# Patient Record
Sex: MALE | Race: OTHER | HISPANIC OR LATINO | URBAN - METROPOLITAN AREA
[De-identification: names, ages, dates, MRNs, and addresses within clinical notes are randomized per-mention and may not be internally consistent; named-entity substitution may affect disease eponyms.]

---

## 2017-07-13 ENCOUNTER — EMERGENCY (EMERGENCY)
Facility: HOSPITAL | Age: 42
LOS: 1 days | Discharge: HOME | End: 2017-07-13
Attending: EMERGENCY MEDICINE
Payer: SELF-PAY

## 2017-07-13 VITALS
RESPIRATION RATE: 18 BRPM | HEART RATE: 79 BPM | TEMPERATURE: 97 F | SYSTOLIC BLOOD PRESSURE: 120 MMHG | DIASTOLIC BLOOD PRESSURE: 89 MMHG | OXYGEN SATURATION: 100 %

## 2017-07-13 VITALS
HEIGHT: 65 IN | SYSTOLIC BLOOD PRESSURE: 106 MMHG | HEART RATE: 85 BPM | DIASTOLIC BLOOD PRESSURE: 70 MMHG | TEMPERATURE: 98 F | RESPIRATION RATE: 16 BRPM | WEIGHT: 164.91 LBS | OXYGEN SATURATION: 98 %

## 2017-07-13 DIAGNOSIS — F10.29 ALCOHOL DEPENDENCE WITH UNSPECIFIED ALCOHOL-INDUCED DISORDER: ICD-10-CM

## 2017-07-13 DIAGNOSIS — F10.129 ALCOHOL ABUSE WITH INTOXICATION, UNSPECIFIED: ICD-10-CM

## 2017-07-13 PROCEDURE — 99053 MED SERV 10PM-8AM 24 HR FAC: CPT

## 2017-07-13 PROCEDURE — 99284 EMERGENCY DEPT VISIT MOD MDM: CPT | Mod: 25

## 2017-07-13 NOTE — ED ADULT NURSE NOTE - OBJECTIVE STATEMENT
Patient complain of alcohol intoxication, sleeping on street, no distress noted. Patient complain of alcohol intoxication, sleeping on street, no distress noted. Patient has roam alert for safety, refused yellow gown, placed closed to nursing station.

## 2017-07-13 NOTE — ED PROVIDER NOTE - MEDICAL DECISION MAKING DETAILS
43 y/o M with suspected EtOH intoxication without trauma. No acute psych concerns or signs of drug abuse. Will observe in ED for improvement of neurologic status.

## 2017-07-13 NOTE — ED ADULT TRIAGE NOTE - CHIEF COMPLAINT QUOTE
BIBA for alcohol intoxication, pt was sleeping in sidewalk after stating that he was drinking alcohol last night, no obvious injuries, calm, cooperates and follows commands

## 2017-07-13 NOTE — ED PROVIDER NOTE - OBJECTIVE STATEMENT
41 y/o M with PMHx of EtOH abuse BIB EMS when found on sidewalk tonight. Pt states that he was drinking a lot last night. Denies any trauma or drug abuse. Also denies abd pain, dizziness, weakness, SI, hallucinations or any other complaints. NKDA.

## 2017-07-13 NOTE — ED ADULT NURSE REASSESSMENT NOTE - NS ED NURSE REASSESS COMMENT FT1
Patient able to ambulate independently. Voided clear yellow urine. Vital signs stable. Roam alert removed.

## 2017-07-13 NOTE — ED ADULT NURSE REASSESSMENT NOTE - NS ED NURSE REASSESS COMMENT FT1
Report received from SHANI Peterson RN. Patient sleeping in bed. Arousable. Breathing on room air. Roam alert and high visibility gown on. In no distress.

## 2018-07-23 ENCOUNTER — EMERGENCY (EMERGENCY)
Facility: HOSPITAL | Age: 43
LOS: 1 days | Discharge: HOME | End: 2018-07-23
Attending: EMERGENCY MEDICINE
Payer: SELF-PAY

## 2018-07-23 VITALS
OXYGEN SATURATION: 100 % | DIASTOLIC BLOOD PRESSURE: 87 MMHG | TEMPERATURE: 100 F | HEART RATE: 100 BPM | HEIGHT: 66 IN | WEIGHT: 179.9 LBS | SYSTOLIC BLOOD PRESSURE: 133 MMHG | RESPIRATION RATE: 18 BRPM

## 2018-07-23 LAB
ACETONE SERPL-MCNC: NEGATIVE — SIGNIFICANT CHANGE UP
ALBUMIN SERPL ELPH-MCNC: 4 G/DL — SIGNIFICANT CHANGE UP (ref 3.5–5)
ALP SERPL-CCNC: 212 U/L — HIGH (ref 40–120)
ALT FLD-CCNC: 43 U/L DA — SIGNIFICANT CHANGE UP (ref 10–60)
ANION GAP SERPL CALC-SCNC: 7 MMOL/L — SIGNIFICANT CHANGE UP (ref 5–17)
ANISOCYTOSIS BLD QL: SLIGHT — SIGNIFICANT CHANGE UP
APPEARANCE UR: CLEAR — SIGNIFICANT CHANGE UP
AST SERPL-CCNC: 204 U/L — HIGH (ref 10–40)
BASOPHILS # BLD AUTO: 0.1 K/UL — SIGNIFICANT CHANGE UP (ref 0–0.2)
BASOPHILS NFR BLD AUTO: 1.3 % — SIGNIFICANT CHANGE UP (ref 0–2)
BILIRUB SERPL-MCNC: 1 MG/DL — SIGNIFICANT CHANGE UP (ref 0.2–1.2)
BILIRUB UR-MCNC: NEGATIVE — SIGNIFICANT CHANGE UP
BUN SERPL-MCNC: 2 MG/DL — LOW (ref 7–18)
CALCIUM SERPL-MCNC: 8.4 MG/DL — SIGNIFICANT CHANGE UP (ref 8.4–10.5)
CHLORIDE SERPL-SCNC: 100 MMOL/L — SIGNIFICANT CHANGE UP (ref 96–108)
CO2 SERPL-SCNC: 28 MMOL/L — SIGNIFICANT CHANGE UP (ref 22–31)
COLOR SPEC: YELLOW — SIGNIFICANT CHANGE UP
CREAT SERPL-MCNC: 0.79 MG/DL — SIGNIFICANT CHANGE UP (ref 0.5–1.3)
DIFF PNL FLD: NEGATIVE — SIGNIFICANT CHANGE UP
EOSINOPHIL # BLD AUTO: 0.1 K/UL — SIGNIFICANT CHANGE UP (ref 0–0.5)
EOSINOPHIL NFR BLD AUTO: 1.5 % — SIGNIFICANT CHANGE UP (ref 0–6)
ETHANOL SERPL-MCNC: 460 MG/DL — HIGH (ref 0–10)
GLUCOSE SERPL-MCNC: 89 MG/DL — SIGNIFICANT CHANGE UP (ref 70–99)
GLUCOSE UR QL: NEGATIVE — SIGNIFICANT CHANGE UP
HCT VFR BLD CALC: 36.6 % — LOW (ref 39–50)
HGB BLD-MCNC: 10.6 G/DL — LOW (ref 13–17)
HYPOCHROMIA BLD QL: SLIGHT — SIGNIFICANT CHANGE UP
KETONES UR-MCNC: NEGATIVE — SIGNIFICANT CHANGE UP
LACTATE SERPL-SCNC: 1.9 MMOL/L — SIGNIFICANT CHANGE UP (ref 0.7–2)
LEUKOCYTE ESTERASE UR-ACNC: NEGATIVE — SIGNIFICANT CHANGE UP
LYMPHOCYTES # BLD AUTO: 1.3 K/UL — SIGNIFICANT CHANGE UP (ref 1–3.3)
LYMPHOCYTES # BLD AUTO: 26.6 % — SIGNIFICANT CHANGE UP (ref 13–44)
MAGNESIUM SERPL-MCNC: 1.5 MG/DL — LOW (ref 1.6–2.6)
MCHC RBC-ENTMCNC: 21.4 PG — LOW (ref 27–34)
MCHC RBC-ENTMCNC: 29 GM/DL — LOW (ref 32–36)
MCV RBC AUTO: 73.7 FL — LOW (ref 80–100)
MICROCYTES BLD QL: SLIGHT — SIGNIFICANT CHANGE UP
MONOCYTES # BLD AUTO: 0.4 K/UL — SIGNIFICANT CHANGE UP (ref 0–0.9)
MONOCYTES NFR BLD AUTO: 7.1 % — SIGNIFICANT CHANGE UP (ref 2–14)
NEUTROPHILS # BLD AUTO: 3.2 K/UL — SIGNIFICANT CHANGE UP (ref 1.8–7.4)
NEUTROPHILS NFR BLD AUTO: 63.5 % — SIGNIFICANT CHANGE UP (ref 43–77)
NITRITE UR-MCNC: NEGATIVE — SIGNIFICANT CHANGE UP
PH UR: 6.5 — SIGNIFICANT CHANGE UP (ref 5–8)
PLAT MORPH BLD: NORMAL — SIGNIFICANT CHANGE UP
PLATELET # BLD AUTO: 42 K/UL — LOW (ref 150–400)
POIKILOCYTOSIS BLD QL AUTO: SLIGHT — SIGNIFICANT CHANGE UP
POLYCHROMASIA BLD QL SMEAR: SLIGHT — SIGNIFICANT CHANGE UP
POTASSIUM SERPL-MCNC: 5.3 MMOL/L — SIGNIFICANT CHANGE UP (ref 3.5–5.3)
POTASSIUM SERPL-SCNC: 5.3 MMOL/L — SIGNIFICANT CHANGE UP (ref 3.5–5.3)
PROT SERPL-MCNC: 10.9 G/DL — HIGH (ref 6–8.3)
PROT UR-MCNC: NEGATIVE — SIGNIFICANT CHANGE UP
RBC # BLD: 4.97 M/UL — SIGNIFICANT CHANGE UP (ref 4.2–5.8)
RBC # FLD: 21.1 % — HIGH (ref 10.3–14.5)
RBC BLD AUTO: ABNORMAL
SODIUM SERPL-SCNC: 135 MMOL/L — SIGNIFICANT CHANGE UP (ref 135–145)
SP GR SPEC: 1.01 — SIGNIFICANT CHANGE UP (ref 1.01–1.02)
TARGETS BLD QL SMEAR: SLIGHT — SIGNIFICANT CHANGE UP
UROBILINOGEN FLD QL: NEGATIVE — SIGNIFICANT CHANGE UP
WBC # BLD: 5.1 K/UL — SIGNIFICANT CHANGE UP (ref 3.8–10.5)
WBC # FLD AUTO: 5.1 K/UL — SIGNIFICANT CHANGE UP (ref 3.8–10.5)

## 2018-07-23 PROCEDURE — 74176 CT ABD & PELVIS W/O CONTRAST: CPT | Mod: 26

## 2018-07-23 PROCEDURE — 71045 X-RAY EXAM CHEST 1 VIEW: CPT | Mod: 26

## 2018-07-23 PROCEDURE — 99285 EMERGENCY DEPT VISIT HI MDM: CPT

## 2018-07-23 RX ORDER — CHLORPROMAZINE HCL 10 MG
25 TABLET ORAL ONCE
Qty: 0 | Refills: 0 | Status: COMPLETED | OUTPATIENT
Start: 2018-07-23 | End: 2018-07-23

## 2018-07-23 RX ORDER — PIPERACILLIN AND TAZOBACTAM 4; .5 G/20ML; G/20ML
3.38 INJECTION, POWDER, LYOPHILIZED, FOR SOLUTION INTRAVENOUS ONCE
Qty: 0 | Refills: 0 | Status: COMPLETED | OUTPATIENT
Start: 2018-07-23 | End: 2018-07-23

## 2018-07-23 RX ORDER — SODIUM CHLORIDE 9 MG/ML
2500 INJECTION INTRAMUSCULAR; INTRAVENOUS; SUBCUTANEOUS ONCE
Qty: 0 | Refills: 0 | Status: COMPLETED | OUTPATIENT
Start: 2018-07-23 | End: 2018-07-23

## 2018-07-23 RX ORDER — MAGNESIUM SULFATE 500 MG/ML
2 VIAL (ML) INJECTION ONCE
Qty: 0 | Refills: 0 | Status: COMPLETED | OUTPATIENT
Start: 2018-07-23 | End: 2018-07-23

## 2018-07-23 RX ADMIN — SODIUM CHLORIDE 5000 MILLILITER(S): 9 INJECTION INTRAMUSCULAR; INTRAVENOUS; SUBCUTANEOUS at 20:14

## 2018-07-23 RX ADMIN — PIPERACILLIN AND TAZOBACTAM 200 GRAM(S): 4; .5 INJECTION, POWDER, LYOPHILIZED, FOR SOLUTION INTRAVENOUS at 20:39

## 2018-07-23 RX ADMIN — Medication 50 GRAM(S): at 23:37

## 2018-07-23 RX ADMIN — Medication 25 MILLIGRAM(S): at 23:40

## 2018-07-23 NOTE — ED PROVIDER NOTE - PROGRESS NOTE DETAILS
Pt's hiccup improves, pt's OX3, CT A/P-hepatic steatosis, pt unable to get up & ambulate, will observe pt.  Pt c/o RU abd pain, will give Morphine SULY to Dr. Jones Pt sleeping , responsive to verbal stimuli, refuses to ambulate. Pt walking, normal gaitm, ate food no vomiting. Pt is well appearing walking with normal gait, stable for discharge and follow up with medical doctor. Pt educated on care and need for follow up. Discussed anticipatory guidance and return precautions. Questions answered. I had a detailed discussion with the patient and/or guardian regarding the historical points, exam findings, and any diagnostic results supporting the discharge diagnosis. Pt walking, normal gaitm, ate food no vomiting. Pt is well appearing walking with normal gait, stable for discharge and follow up with medical doctor. Pt educated on care and need for follow up. Discussed anticipatory guidance and return precautions. Questions answered. I had a detailed discussion with the patient and/or guardian regarding the historical points, exam findings, and any diagnostic results supporting the discharge diagnosis. Denies suicidal ideation, refused detox, denies being un-domiciled, refused  consult.

## 2018-07-23 NOTE — ED PROVIDER NOTE - OBJECTIVE STATEMENT
42 y/o M pt with no significant PMHx except EtOH abuse and no significant PSHx BIB EMS to ED c/o  right lower chest pain for x2 days along with hiccups. Patient has been experiencing a decrease in appetite. Patient has a Hx of EtOH abuse, patient's last drink was today. Patient admits to ingesting 48 cans of beer [place] Patient denies any other complaints. NKDA.

## 2018-07-23 NOTE — ED PROVIDER NOTE - CARE PLAN
Principal Discharge DX:	Alcohol intoxication  Secondary Diagnosis:	Hepatic steatosis  Secondary Diagnosis:	Hypomagnesemia

## 2018-07-24 VITALS
HEART RATE: 79 BPM | SYSTOLIC BLOOD PRESSURE: 119 MMHG | DIASTOLIC BLOOD PRESSURE: 79 MMHG | OXYGEN SATURATION: 98 % | TEMPERATURE: 98 F | RESPIRATION RATE: 18 BRPM

## 2018-07-24 RX ORDER — ONDANSETRON 8 MG/1
4 TABLET, FILM COATED ORAL ONCE
Qty: 0 | Refills: 0 | Status: COMPLETED | OUTPATIENT
Start: 2018-07-24 | End: 2018-07-24

## 2018-07-24 RX ORDER — MORPHINE SULFATE 50 MG/1
4 CAPSULE, EXTENDED RELEASE ORAL ONCE
Qty: 0 | Refills: 0 | Status: DISCONTINUED | OUTPATIENT
Start: 2018-07-24 | End: 2018-07-24

## 2018-07-24 RX ADMIN — ONDANSETRON 4 MILLIGRAM(S): 8 TABLET, FILM COATED ORAL at 00:50

## 2018-07-24 RX ADMIN — MORPHINE SULFATE 4 MILLIGRAM(S): 50 CAPSULE, EXTENDED RELEASE ORAL at 00:51

## 2018-07-24 RX ADMIN — MORPHINE SULFATE 4 MILLIGRAM(S): 50 CAPSULE, EXTENDED RELEASE ORAL at 00:50

## 2018-07-25 LAB
CULTURE RESULTS: SIGNIFICANT CHANGE UP
SPECIMEN SOURCE: SIGNIFICANT CHANGE UP

## 2018-07-29 LAB
CULTURE RESULTS: SIGNIFICANT CHANGE UP
CULTURE RESULTS: SIGNIFICANT CHANGE UP
SPECIMEN SOURCE: SIGNIFICANT CHANGE UP
SPECIMEN SOURCE: SIGNIFICANT CHANGE UP

## 2018-12-17 ENCOUNTER — EMERGENCY (EMERGENCY)
Facility: HOSPITAL | Age: 43
LOS: 1 days | Discharge: HOME | End: 2018-12-17
Attending: EMERGENCY MEDICINE
Payer: SELF-PAY

## 2018-12-17 VITALS
SYSTOLIC BLOOD PRESSURE: 115 MMHG | HEART RATE: 100 BPM | TEMPERATURE: 97 F | RESPIRATION RATE: 18 BRPM | DIASTOLIC BLOOD PRESSURE: 75 MMHG | HEIGHT: 67 IN | OXYGEN SATURATION: 100 % | WEIGHT: 179.9 LBS

## 2018-12-17 PROCEDURE — 82962 GLUCOSE BLOOD TEST: CPT

## 2018-12-17 PROCEDURE — 99285 EMERGENCY DEPT VISIT HI MDM: CPT

## 2018-12-17 PROCEDURE — 99284 EMERGENCY DEPT VISIT MOD MDM: CPT

## 2018-12-17 NOTE — ED ADULT NURSE NOTE - NSIMPLEMENTINTERV_GEN_ALL_ED
Implemented All Universal Safety Interventions:  [place] to call system. Call bell, personal items and telephone within reach. Instruct patient to call for assistance. Room bathroom lighting operational. Non-slip footwear when patient is off stretcher. Physically safe environment: no spills, clutter or unnecessary equipment. Stretcher in lowest position, wheels locked, appropriate side rails in place.

## 2018-12-17 NOTE — ED PROVIDER NOTE - PROGRESS NOTE DETAILS
patient undressed, no evidence of swelling bruising or tenderness to palpation on exam of either arm. now ambulatory with steady gait, awake alert, no tongue fasiculations, requesting discharge

## 2018-12-17 NOTE — ED PROVIDER NOTE - OBJECTIVE STATEMENT
42 y/o M with no significant PMHx or PSHx presents to the ED brought in by ambulance. Patient is homeless and was found sleeping on the street. Patient admits to drinking one beer today. Patient reports L arm pain after falling today. NKDA.

## 2023-03-16 ENCOUNTER — APPOINTMENT (OUTPATIENT)
Dept: CT IMAGING | Age: 48
End: 2023-03-16
Attending: EMERGENCY MEDICINE
Payer: MEDICAID

## 2023-03-16 ENCOUNTER — HOSPITAL ENCOUNTER (INPATIENT)
Age: 48
LOS: 6 days | Discharge: HOME | End: 2023-03-22
Attending: EMERGENCY MEDICINE | Admitting: STUDENT IN AN ORGANIZED HEALTH CARE EDUCATION/TRAINING PROGRAM
Payer: MEDICAID

## 2023-03-16 ENCOUNTER — APPOINTMENT (OUTPATIENT)
Dept: GENERAL RADIOLOGY | Age: 48
End: 2023-03-16
Attending: EMERGENCY MEDICINE
Payer: MEDICAID

## 2023-03-16 ENCOUNTER — APPOINTMENT (OUTPATIENT)
Dept: ULTRASOUND IMAGING | Age: 48
End: 2023-03-16
Attending: STUDENT IN AN ORGANIZED HEALTH CARE EDUCATION/TRAINING PROGRAM
Payer: MEDICAID

## 2023-03-16 ENCOUNTER — APPOINTMENT (OUTPATIENT)
Dept: GENERAL RADIOLOGY | Age: 48
End: 2023-03-16
Attending: STUDENT IN AN ORGANIZED HEALTH CARE EDUCATION/TRAINING PROGRAM
Payer: MEDICAID

## 2023-03-16 ENCOUNTER — APPOINTMENT (OUTPATIENT)
Dept: VASCULAR SURGERY | Age: 48
End: 2023-03-16
Attending: EMERGENCY MEDICINE
Payer: MEDICAID

## 2023-03-16 DIAGNOSIS — L03.115 CELLULITIS OF RIGHT LOWER EXTREMITY: ICD-10-CM

## 2023-03-16 DIAGNOSIS — K74.60 HEPATIC CIRRHOSIS, UNSPECIFIED HEPATIC CIRRHOSIS TYPE, UNSPECIFIED WHETHER ASCITES PRESENT (HCC): ICD-10-CM

## 2023-03-16 DIAGNOSIS — S82.891S CLOSED FRACTURE OF RIGHT ANKLE, SEQUELA: Primary | ICD-10-CM

## 2023-03-16 DIAGNOSIS — I26.99 PULMONARY INFARCTION (HCC): ICD-10-CM

## 2023-03-16 DIAGNOSIS — K80.20 CALCULUS OF GALLBLADDER WITHOUT CHOLECYSTITIS WITHOUT OBSTRUCTION: ICD-10-CM

## 2023-03-16 DIAGNOSIS — G93.40 ENCEPHALOPATHY: ICD-10-CM

## 2023-03-16 DIAGNOSIS — R60.0 EDEMA OF RIGHT LOWER EXTREMITY: ICD-10-CM

## 2023-03-16 PROBLEM — R41.82 AMS (ALTERED MENTAL STATUS): Status: ACTIVE | Noted: 2023-03-16

## 2023-03-16 LAB
ALBUMIN SERPL-MCNC: 2.9 G/DL (ref 3.5–5)
ALBUMIN/GLOB SERPL: 0.5 (ref 1.1–2.2)
ALP SERPL-CCNC: 112 U/L (ref 45–117)
ALT SERPL-CCNC: 50 U/L (ref 12–78)
AMMONIA PLAS-SCNC: 56 UMOL/L
AMPHET UR QL SCN: NEGATIVE
ANION GAP SERPL CALC-SCNC: 10 MMOL/L (ref 5–15)
APPEARANCE UR: CLEAR
AST SERPL-CCNC: 113 U/L (ref 15–37)
BACTERIA URNS QL MICRO: NEGATIVE /HPF
BARBITURATES UR QL SCN: NEGATIVE
BASOPHILS # BLD: 0 K/UL (ref 0–0.1)
BASOPHILS NFR BLD: 1 % (ref 0–1)
BENZODIAZ UR QL: POSITIVE
BILIRUB SERPL-MCNC: 1.9 MG/DL (ref 0.2–1)
BILIRUB UR QL CFM: NEGATIVE
BUN SERPL-MCNC: 12 MG/DL (ref 6–20)
BUN/CREAT SERPL: 16 (ref 12–20)
CALCIUM SERPL-MCNC: 7.9 MG/DL (ref 8.5–10.1)
CANNABINOIDS UR QL SCN: NEGATIVE
CHLORIDE SERPL-SCNC: 99 MMOL/L (ref 97–108)
CO2 SERPL-SCNC: 26 MMOL/L (ref 21–32)
COCAINE UR QL SCN: NEGATIVE
COLOR UR: ABNORMAL
CREAT SERPL-MCNC: 0.75 MG/DL (ref 0.7–1.3)
D DIMER PPP FEU-MCNC: 19.54 MG/L FEU (ref 0–0.65)
DIFFERENTIAL METHOD BLD: ABNORMAL
DRUG SCRN COMMENT,DRGCM: ABNORMAL
EOSINOPHIL # BLD: 0.2 K/UL (ref 0–0.4)
EOSINOPHIL NFR BLD: 7 % (ref 0–7)
EPITH CASTS URNS QL MICRO: NORMAL /LPF
ERYTHROCYTE [DISTWIDTH] IN BLOOD BY AUTOMATED COUNT: 23 % (ref 11.5–14.5)
ETHANOL SERPL-MCNC: <10 MG/DL
FLUAV RNA SPEC QL NAA+PROBE: NOT DETECTED
FLUBV RNA SPEC QL NAA+PROBE: NOT DETECTED
GLOBULIN SER CALC-MCNC: 5.6 G/DL (ref 2–4)
GLUCOSE SERPL-MCNC: 95 MG/DL (ref 65–100)
GLUCOSE UR STRIP.AUTO-MCNC: NEGATIVE MG/DL
HCT VFR BLD AUTO: 31.2 % (ref 36.6–50.3)
HGB BLD-MCNC: 10.2 G/DL (ref 12.1–17)
HGB UR QL STRIP: NEGATIVE
IMM GRANULOCYTES # BLD AUTO: 0 K/UL (ref 0–0.04)
IMM GRANULOCYTES NFR BLD AUTO: 0 % (ref 0–0.5)
INR PPP: 1.6 (ref 0.9–1.1)
KETONES UR QL STRIP.AUTO: ABNORMAL MG/DL
LEUKOCYTE ESTERASE UR QL STRIP.AUTO: ABNORMAL
LYMPHOCYTES # BLD: 0.7 K/UL (ref 0.8–3.5)
LYMPHOCYTES NFR BLD: 23 % (ref 12–49)
MAGNESIUM SERPL-MCNC: 1 MG/DL (ref 1.6–2.4)
MCH RBC QN AUTO: 27.5 PG (ref 26–34)
MCHC RBC AUTO-ENTMCNC: 32.7 G/DL (ref 30–36.5)
MCV RBC AUTO: 84.1 FL (ref 80–99)
METHADONE UR QL: NEGATIVE
MONOCYTES # BLD: 0.8 K/UL (ref 0–1)
MONOCYTES NFR BLD: 25 % (ref 5–13)
NEUTS SEG # BLD: 1.4 K/UL (ref 1.8–8)
NEUTS SEG NFR BLD: 44 % (ref 32–75)
NITRITE UR QL STRIP.AUTO: NEGATIVE
NRBC # BLD: 0 K/UL (ref 0–0.01)
NRBC BLD-RTO: 0 PER 100 WBC
OPIATES UR QL: NEGATIVE
PCP UR QL: NEGATIVE
PH UR STRIP: 6.5 (ref 5–8)
PLATELET # BLD AUTO: 66 K/UL (ref 150–400)
PLATELET COMMENTS,PCOM: ABNORMAL
POTASSIUM SERPL-SCNC: 3.4 MMOL/L (ref 3.5–5.1)
PROT SERPL-MCNC: 8.5 G/DL (ref 6.4–8.2)
PROT UR STRIP-MCNC: NEGATIVE MG/DL
PROTHROMBIN TIME: 15.8 SEC (ref 9–11.1)
RBC # BLD AUTO: 3.71 M/UL (ref 4.1–5.7)
RBC #/AREA URNS HPF: NORMAL /HPF (ref 0–5)
RBC MORPH BLD: ABNORMAL
RBC MORPH BLD: ABNORMAL
SARS-COV-2 RNA RESP QL NAA+PROBE: NOT DETECTED
SODIUM SERPL-SCNC: 135 MMOL/L (ref 136–145)
SP GR UR REFRACTOMETRY: 1.02
TSH SERPL DL<=0.05 MIU/L-ACNC: 1.5 UIU/ML (ref 0.36–3.74)
UROBILINOGEN UR QL STRIP.AUTO: >8 EU/DL (ref 0.2–1)
WBC # BLD AUTO: 3.1 K/UL (ref 4.1–11.1)
WBC URNS QL MICRO: NORMAL /HPF (ref 0–4)

## 2023-03-16 PROCEDURE — 86592 SYPHILIS TEST NON-TREP QUAL: CPT

## 2023-03-16 PROCEDURE — 73610 X-RAY EXAM OF ANKLE: CPT

## 2023-03-16 PROCEDURE — 93971 EXTREMITY STUDY: CPT

## 2023-03-16 PROCEDURE — 96374 THER/PROPH/DIAG INJ IV PUSH: CPT

## 2023-03-16 PROCEDURE — 82140 ASSAY OF AMMONIA: CPT

## 2023-03-16 PROCEDURE — 36415 COLL VENOUS BLD VENIPUNCTURE: CPT

## 2023-03-16 PROCEDURE — 80053 COMPREHEN METABOLIC PANEL: CPT

## 2023-03-16 PROCEDURE — 74011250636 HC RX REV CODE- 250/636: Performed by: NURSE PRACTITIONER

## 2023-03-16 PROCEDURE — 81001 URINALYSIS AUTO W/SCOPE: CPT

## 2023-03-16 PROCEDURE — 71045 X-RAY EXAM CHEST 1 VIEW: CPT

## 2023-03-16 PROCEDURE — 74011000250 HC RX REV CODE- 250: Performed by: STUDENT IN AN ORGANIZED HEALTH CARE EDUCATION/TRAINING PROGRAM

## 2023-03-16 PROCEDURE — 80074 ACUTE HEPATITIS PANEL: CPT

## 2023-03-16 PROCEDURE — 74011250636 HC RX REV CODE- 250/636: Performed by: STUDENT IN AN ORGANIZED HEALTH CARE EDUCATION/TRAINING PROGRAM

## 2023-03-16 PROCEDURE — 99285 EMERGENCY DEPT VISIT HI MDM: CPT

## 2023-03-16 PROCEDURE — 87636 SARSCOV2 & INF A&B AMP PRB: CPT

## 2023-03-16 PROCEDURE — 80307 DRUG TEST PRSMV CHEM ANLYZR: CPT

## 2023-03-16 PROCEDURE — 82077 ASSAY SPEC XCP UR&BREATH IA: CPT

## 2023-03-16 PROCEDURE — 74177 CT ABD & PELVIS W/CONTRAST: CPT

## 2023-03-16 PROCEDURE — 70450 CT HEAD/BRAIN W/O DYE: CPT

## 2023-03-16 PROCEDURE — 93971 EXTREMITY STUDY: CPT | Performed by: INTERNAL MEDICINE

## 2023-03-16 PROCEDURE — 82607 VITAMIN B-12: CPT

## 2023-03-16 PROCEDURE — 76705 ECHO EXAM OF ABDOMEN: CPT

## 2023-03-16 PROCEDURE — 85025 COMPLETE CBC W/AUTO DIFF WBC: CPT

## 2023-03-16 PROCEDURE — 84443 ASSAY THYROID STIM HORMONE: CPT

## 2023-03-16 PROCEDURE — 74011000636 HC RX REV CODE- 636: Performed by: EMERGENCY MEDICINE

## 2023-03-16 PROCEDURE — 74011250636 HC RX REV CODE- 250/636: Performed by: EMERGENCY MEDICINE

## 2023-03-16 PROCEDURE — 71275 CT ANGIOGRAPHY CHEST: CPT

## 2023-03-16 PROCEDURE — 85379 FIBRIN DEGRADATION QUANT: CPT

## 2023-03-16 PROCEDURE — 36600 WITHDRAWAL OF ARTERIAL BLOOD: CPT

## 2023-03-16 PROCEDURE — 82746 ASSAY OF FOLIC ACID SERUM: CPT

## 2023-03-16 PROCEDURE — HZ2ZZZZ DETOXIFICATION SERVICES FOR SUBSTANCE ABUSE TREATMENT: ICD-10-PCS | Performed by: INTERNAL MEDICINE

## 2023-03-16 PROCEDURE — 85610 PROTHROMBIN TIME: CPT

## 2023-03-16 PROCEDURE — 74011250637 HC RX REV CODE- 250/637: Performed by: STUDENT IN AN ORGANIZED HEALTH CARE EDUCATION/TRAINING PROGRAM

## 2023-03-16 PROCEDURE — 74011000258 HC RX REV CODE- 258: Performed by: STUDENT IN AN ORGANIZED HEALTH CARE EDUCATION/TRAINING PROGRAM

## 2023-03-16 PROCEDURE — 83735 ASSAY OF MAGNESIUM: CPT

## 2023-03-16 PROCEDURE — 65270000032 HC RM SEMIPRIVATE

## 2023-03-16 RX ORDER — DOXYCYCLINE HYCLATE 100 MG
100 TABLET ORAL 2 TIMES DAILY
Qty: 14 TABLET | Refills: 0 | Status: SHIPPED | OUTPATIENT
Start: 2023-03-16 | End: 2023-03-21 | Stop reason: SDUPTHER

## 2023-03-16 RX ORDER — SODIUM CHLORIDE 0.9 % (FLUSH) 0.9 %
5-40 SYRINGE (ML) INJECTION EVERY 8 HOURS
Status: DISCONTINUED | OUTPATIENT
Start: 2023-03-16 | End: 2023-03-22 | Stop reason: HOSPADM

## 2023-03-16 RX ORDER — LORAZEPAM 2 MG/ML
1 INJECTION INTRAMUSCULAR
Status: DISCONTINUED | OUTPATIENT
Start: 2023-03-16 | End: 2023-03-22 | Stop reason: HOSPADM

## 2023-03-16 RX ORDER — DOXYCYCLINE HYCLATE 100 MG
100 TABLET ORAL EVERY 12 HOURS
Status: DISCONTINUED | OUTPATIENT
Start: 2023-03-16 | End: 2023-03-22 | Stop reason: HOSPADM

## 2023-03-16 RX ORDER — KETOROLAC TROMETHAMINE 30 MG/ML
30 INJECTION, SOLUTION INTRAMUSCULAR; INTRAVENOUS
Status: COMPLETED | OUTPATIENT
Start: 2023-03-16 | End: 2023-03-16

## 2023-03-16 RX ORDER — SODIUM CHLORIDE, SODIUM LACTATE, POTASSIUM CHLORIDE, CALCIUM CHLORIDE 600; 310; 30; 20 MG/100ML; MG/100ML; MG/100ML; MG/100ML
100 INJECTION, SOLUTION INTRAVENOUS CONTINUOUS
Status: DISCONTINUED | OUTPATIENT
Start: 2023-03-16 | End: 2023-03-18

## 2023-03-16 RX ORDER — ENOXAPARIN SODIUM 100 MG/ML
40 INJECTION SUBCUTANEOUS DAILY
Status: DISCONTINUED | OUTPATIENT
Start: 2023-03-17 | End: 2023-03-22 | Stop reason: HOSPADM

## 2023-03-16 RX ORDER — ACETAMINOPHEN 325 MG/1
650 TABLET ORAL
Status: ON HOLD | COMMUNITY
End: 2023-03-16

## 2023-03-16 RX ORDER — FOLIC ACID 1 MG/1
1 TABLET ORAL DAILY
Status: DISCONTINUED | OUTPATIENT
Start: 2023-03-17 | End: 2023-03-22 | Stop reason: HOSPADM

## 2023-03-16 RX ORDER — ACETAMINOPHEN 650 MG/1
650 SUPPOSITORY RECTAL
Status: DISCONTINUED | OUTPATIENT
Start: 2023-03-16 | End: 2023-03-22 | Stop reason: HOSPADM

## 2023-03-16 RX ORDER — CEPHALEXIN 500 MG/1
500 CAPSULE ORAL EVERY 6 HOURS
Status: DISCONTINUED | OUTPATIENT
Start: 2023-03-16 | End: 2023-03-16

## 2023-03-16 RX ORDER — BACITRACIN 500 UNIT/G
1 PACKET (EA) TOPICAL ONCE
Status: DISCONTINUED | OUTPATIENT
Start: 2023-03-16 | End: 2023-03-16

## 2023-03-16 RX ORDER — MULTIVITAMIN
1 TABLET ORAL DAILY
Status: ON HOLD | COMMUNITY
End: 2023-03-16

## 2023-03-16 RX ORDER — CEPHALEXIN 500 MG/1
500 CAPSULE ORAL 4 TIMES DAILY
Qty: 28 CAPSULE | Refills: 0 | Status: SHIPPED | OUTPATIENT
Start: 2023-03-16 | End: 2023-03-16

## 2023-03-16 RX ORDER — ONDANSETRON 4 MG/1
4 TABLET, ORALLY DISINTEGRATING ORAL
Status: DISCONTINUED | OUTPATIENT
Start: 2023-03-16 | End: 2023-03-22 | Stop reason: HOSPADM

## 2023-03-16 RX ORDER — ONDANSETRON 2 MG/ML
4 INJECTION INTRAMUSCULAR; INTRAVENOUS
Status: DISCONTINUED | OUTPATIENT
Start: 2023-03-16 | End: 2023-03-22 | Stop reason: HOSPADM

## 2023-03-16 RX ORDER — MAGNESIUM SULFATE 1 G/100ML
1 INJECTION INTRAVENOUS ONCE
Status: COMPLETED | OUTPATIENT
Start: 2023-03-16 | End: 2023-03-16

## 2023-03-16 RX ORDER — POLYETHYLENE GLYCOL 3350 17 G/17G
17 POWDER, FOR SOLUTION ORAL DAILY PRN
Status: DISCONTINUED | OUTPATIENT
Start: 2023-03-16 | End: 2023-03-22 | Stop reason: HOSPADM

## 2023-03-16 RX ORDER — LORAZEPAM 2 MG/ML
2 INJECTION INTRAMUSCULAR
Status: DISCONTINUED | OUTPATIENT
Start: 2023-03-16 | End: 2023-03-22 | Stop reason: HOSPADM

## 2023-03-16 RX ORDER — MORPHINE SULFATE 2 MG/ML
2 INJECTION, SOLUTION INTRAMUSCULAR; INTRAVENOUS
Status: DISCONTINUED | OUTPATIENT
Start: 2023-03-16 | End: 2023-03-22 | Stop reason: HOSPADM

## 2023-03-16 RX ORDER — MAGNESIUM SULFATE HEPTAHYDRATE 40 MG/ML
2 INJECTION, SOLUTION INTRAVENOUS ONCE
Status: COMPLETED | OUTPATIENT
Start: 2023-03-16 | End: 2023-03-16

## 2023-03-16 RX ORDER — ACETAMINOPHEN 325 MG/1
650 TABLET ORAL
Status: DISCONTINUED | OUTPATIENT
Start: 2023-03-16 | End: 2023-03-22 | Stop reason: HOSPADM

## 2023-03-16 RX ORDER — ENOXAPARIN SODIUM 100 MG/ML
40 INJECTION SUBCUTANEOUS DAILY
Status: DISCONTINUED | OUTPATIENT
Start: 2023-03-17 | End: 2023-03-16

## 2023-03-16 RX ORDER — SODIUM CHLORIDE 0.9 % (FLUSH) 0.9 %
5-40 SYRINGE (ML) INJECTION AS NEEDED
Status: DISCONTINUED | OUTPATIENT
Start: 2023-03-16 | End: 2023-03-22 | Stop reason: HOSPADM

## 2023-03-16 RX ORDER — POTASSIUM CHLORIDE 7.45 MG/ML
10 INJECTION INTRAVENOUS
Status: COMPLETED | OUTPATIENT
Start: 2023-03-16 | End: 2023-03-17

## 2023-03-16 RX ADMIN — POTASSIUM CHLORIDE 10 MEQ: 7.46 INJECTION, SOLUTION INTRAVENOUS at 23:50

## 2023-03-16 RX ADMIN — MAGNESIUM SULFATE HEPTAHYDRATE 1 G: 1 INJECTION, SOLUTION INTRAVENOUS at 22:03

## 2023-03-16 RX ADMIN — SODIUM CHLORIDE, PRESERVATIVE FREE 10 ML: 5 INJECTION INTRAVENOUS at 17:06

## 2023-03-16 RX ADMIN — THIAMINE HYDROCHLORIDE 400 MG: 100 INJECTION, SOLUTION INTRAMUSCULAR; INTRAVENOUS at 17:06

## 2023-03-16 RX ADMIN — CEFTRIAXONE SODIUM 1 G: 1 INJECTION, POWDER, FOR SOLUTION INTRAMUSCULAR; INTRAVENOUS at 17:06

## 2023-03-16 RX ADMIN — POTASSIUM CHLORIDE 10 MEQ: 7.46 INJECTION, SOLUTION INTRAVENOUS at 22:26

## 2023-03-16 RX ADMIN — THIAMINE HYDROCHLORIDE 400 MG: 100 INJECTION, SOLUTION INTRAMUSCULAR; INTRAVENOUS at 22:00

## 2023-03-16 RX ADMIN — SODIUM CHLORIDE, POTASSIUM CHLORIDE, SODIUM LACTATE AND CALCIUM CHLORIDE 100 ML/HR: 600; 310; 30; 20 INJECTION, SOLUTION INTRAVENOUS at 20:07

## 2023-03-16 RX ADMIN — LORAZEPAM 2 MG: 2 INJECTION INTRAMUSCULAR; INTRAVENOUS at 17:40

## 2023-03-16 RX ADMIN — SODIUM CHLORIDE, PRESERVATIVE FREE 10 ML: 5 INJECTION INTRAVENOUS at 22:08

## 2023-03-16 RX ADMIN — MAGNESIUM SULFATE HEPTAHYDRATE 2 G: 40 INJECTION, SOLUTION INTRAVENOUS at 20:07

## 2023-03-16 RX ADMIN — LACTULOSE 15 ML: 20 SOLUTION ORAL at 17:43

## 2023-03-16 RX ADMIN — KETOROLAC TROMETHAMINE 30 MG: 30 INJECTION, SOLUTION INTRAMUSCULAR; INTRAVENOUS at 03:30

## 2023-03-16 RX ADMIN — IOPAMIDOL 100 ML: 755 INJECTION, SOLUTION INTRAVENOUS at 11:33

## 2023-03-16 NOTE — PROGRESS NOTES
[facility] Medication Reconciliation    Information obtained from:Patient (thru )  RxQuery data available1:yes    Comments/recommendations:    Mr. Andrzej Qureshi is a Lao speaking only patient. Med rec was reviewed with the assistance of a . Patient is not presently taking any medication at this time. The [place] Prescription Monitoring Program () was accessed to determine fill history of any controlled medications. Patient was not listed    Medication changes (since last review):  Removed  Acetaminophen, multivitamin       1RxQuery pharmacy benefit data reflects medications filled and processed through the patient's insurance, however                this data does NOT capture whether the medication was picked up or is currently being taken by the patient. Patient allergies:    Allergies as of 03/16/2023    (No Known Allergies)         None          Thank you,  TRINA Thompson

## 2023-03-16 NOTE — DISCHARGE INSTRUCTIONS
You have been admitted and treated at [facility] due to Pneumonia and rigth leg cellulitis. You should complete antibiotics as prescribed. You should abstinent from alcohol. You should follow up PCP in 1 week, magnesium and potassium level should be checked in 1 week. Do not drive a car until you see your PCP. Pending blood work: QFT. QuantiFERON Plus Negative   Negative      You have liver cirrhosis most likely related to alcohol use and alcohol cessation is recommended. You should follow up hepatologist as outpatient. You have chronic fracture of right ankle and should follow up with PCP and get referral to ortho surgeon for further evaluation as outpatient, WBAT right leg.

## 2023-03-16 NOTE — ED NOTES
Pt changed into hospital gown and gripper socks. Pt then transported back down to CT to complete ordered imaging.

## 2023-03-16 NOTE — H&P
History and Physical    Date of Service:  3/16/2023  Primary Care Provider: None  Source of information: The patient and Chart review    Chief Complaint: Leg Pain (Right leg pain, swelling)      History of Presenting Illness:   Igor Pressley is a 52 y.o. male who presents with right ankle and right foot pain for several days ago. Patient is a Bulgarian-speaking patient had a good historian most of the history was provided by Bulgarian-speaking RN at bedside and translating services. Patient is complaining of pain in right foot and right leg. Denied any nausea vomiting abdominal pain. Patient is only AAO x1 cannot tell time date or where he is going on to tell his name PT patient states that he for past 1 year has been drinking really heavily but has not had any drink for past 1 month. End[facility] that [facility] but he got endoscopy and foot repair. Patient denies taking any medication at home. Denies tobacco use any decrease in drug use    The patient denies any headache, blurry vision, sore throat, trouble swallowing, trouble with speech, chest pain, SOB, cough, fever, chills, N/V/D, abd pain, urinary symptoms, constipation, recent travels, sick contacts, focal or generalized neurological symptoms, falls, injuries, rashes, contact with COVID-19 diagnosed patients, hematemesis, melena, hemoptysis, hematuria, rashes, denies starting any new medications and denies any other concerns or problems besides as mentioned above. REVIEW OF SYSTEMS:  A comprehensive review of systems was negative except for: Musculoskeletal: positive for arthralgias     History reviewed. No pertinent past medical history. History reviewed. No pertinent surgical history. Prior to Admission medications    Medication Sig Start Date End Date Taking? Authorizing Provider   multivitamin (ONE A DAY) tablet Take 1 Tablet by mouth daily.    Yes Other, MD Whitney   acetaminophen (TylenoL) 325 mg tablet Take 650 mg by mouth every four (4) hours as needed for Pain. Yes Other, MD Whitney   doxycycline (VIBRA-TABS) 100 mg tablet Take 1 Tablet by mouth two (2) times a day for 7 days. 3/16/23 3/23/23 Yes Genesis Gaming MD     No Known Allergies   History reviewed. No pertinent family history. Social History:  reports that he has never smoked. He has never used smokeless tobacco. He reports that he does not use drugs. Social Determinants of Health     Tobacco Use: Low Risk     Smoking Tobacco Use: Never    Smokeless Tobacco Use: Never    Passive Exposure: Not on file   Alcohol Use: Not on file   Financial Resource Strain: Not on file   Food Insecurity: Not on file   Transportation Needs: Not on file   Physical Activity: Not on file   Stress: Not on file   Social Connections: Not on file   Intimate Partner Violence: Not on file   Depression: Not on file   Housing Stability: Not on file        Medications were reconciled to the best of my ability given all available resources at the time of admission. Route is PO if not otherwise noted. Family and social history were personally reviewed, all pertinent and relevant details are outlined as above.     Objective:   Visit Vitals  /61   Pulse 87   Temp 99.1 °F (37.3 °C)   Resp 25   Ht 5' 1.81\" (1.57 m)   Wt 79.4 kg (175 lb)   SpO2 97%   BMI 32.20 kg/m²      O2 Device: None (Room air)    PHYSICAL EXAM:   General: Alert x oriented x 1, awake, no acute distress,   HEENT: PEERL, EOMI, moist mucus membranes  Neck: Supple, no JVD, no meningeal signs  Chest: Clear to auscultation bilaterally   CVS: RRR, S1 S2 heard, no murmurs/rubs/gallops  Abd: Soft, non-tender, non-distended, +bowel sounds   Ext: No clubbing, no cyanosis, no edema right lower ankle tender and red  Neuro/Psych: CN 2-12 grossly intact, sensory grossly within normal limit, Strength 5/5 in all extremities, DTR 1+ x 4  Cap refill: Brisk, less than 3 seconds  Pulses: 2+, symmetric in all extremities  Skin: Warm, dry, without rashes or lesions right lower ankle red    Data Review:   I have independently reviewed and interpreted patient's lab and all other diagnostic data    Abnormal Labs Reviewed   CBC WITH AUTOMATED DIFF - Abnormal; Notable for the following components:       Result Value    WBC 3.1 (*)     RBC 3.71 (*)     HGB 10.2 (*)     HCT 31.2 (*)     RDW 23.0 (*)     PLATELET 66 (*)     MONOCYTES 25 (*)     ABS. NEUTROPHILS 1.4 (*)     ABS. LYMPHOCYTES 0.7 (*)     All other components within normal limits   METABOLIC PANEL, COMPREHENSIVE - Abnormal; Notable for the following components:    Sodium 135 (*)     Potassium 3.4 (*)     Calcium 7.9 (*)     Bilirubin, total 1.9 (*)     AST (SGOT) 113 (*)     Protein, total 8.5 (*)     Albumin 2.9 (*)     Globulin 5.6 (*)     A-G Ratio 0.5 (*)     All other components within normal limits   D DIMER - Abnormal; Notable for the following components:    D-dimer 19.54 (*)     All other components within normal limits   URINALYSIS W/ RFLX MICROSCOPIC - Abnormal; Notable for the following components:    Ketone TRACE (*)     Urobilinogen >8.0 (*)     Leukocyte Esterase TRACE (*)     All other components within normal limits   DRUG SCREEN, URINE - Abnormal; Notable for the following components:    BENZODIAZEPINES Positive (*)     All other components within normal limits   AMMONIA - Abnormal; Notable for the following components:    Ammonia, plasma 56 (*)     All other components within normal limits       All Micro Results       Procedure Component Value Units Date/Time    COVID-19 WITH INFLUENZA A/B [[id number]] Collected: 03/16/23 0647    Order Status: Completed Specimen: Nasopharyngeal Updated: 03/16/23 0714     SARS-CoV-2 by PCR Not detected        Comment: Not Detected results do not preclude SARS-CoV-2 infection and should not be used as the sole basis for patient management decisions. Results must be combined with clinical observations, patient history, and epidemiological information. Influenza A by PCR Not detected        Influenza B by PCR Not detected        Comment: Testing was performed using tay Maddie SARS-CoV-2 and Influenza A/B nucleic acid assay. This test is a multiplex Real-Time Reverse Transcriptase Polymerase Chain Reaction (RT-PCR) based in vitro diagnostic test intended for the qualitative detection of nucleic acids from SARS-CoV-2, Influenza A, and Influenza B in nasopharyngeal and nasal swab specimens for use under the FDA's Emergency Use Authorization (EUA) only. Fact sheet for Patients: FindDrives.pl  Fact sheet for Healthcare Providers: FindDrives.pl                 IMAGING:   CT ABD PELV W CONT   Final Result   1. Suboptimal contrast bolus. No large central PE. 2. Low-density left upper lobe mass, interspersed with air, suggests a pulmonary   infarction. 3. Cirrhosis and portal hypertension. Trace ascites. CTA CHEST W OR W WO CONT   Final Result   1. Suboptimal contrast bolus. No large central PE. 2. Low-density left upper lobe mass, interspersed with air, suggests a pulmonary   infarction. 3. Cirrhosis and portal hypertension. Trace ascites. CT HEAD WO CONT   Final Result   No acute findings. Suspect prominent atrophy. XR ANKLE RT MIN 3 V   Final Result   Chronic distal fibular and medial malleoli fractures with soft   tissue swelling. DUPLEX LOWER EXT VENOUS RIGHT    (Results Pending)   US ABD LTD    (Results Pending)   XR CHEST PORT    (Results Pending)   MRI BRAIN WO CONT    (Results Pending)        ECG/ECHO:  No results found for this or any previous visit. Notes reviewed from all clinical/nonclinical/nursing services involved in patient's clinical care. Care coordination discussions were held with appropriate clinical/nonclinical/ nursing providers based on care coordination needs.      Assessment and Plan :   Given the patient's current clinical presentation, there is a high level of concern for decompensation if discharged from the emergency department. Complex decision making was performed, which includes reviewing the patient's available past medical records, laboratory results, and imaging studies. Active Problems:    AMS (altered mental status) (3/16/2023)        #Altered mental status  #History of heavy alcohol use  -CT head negative for acute process. UDS positive benzodiazepine  -Ammonia 56  -Differentials include hepatic encephalopathy versus Wernicke's Korsakoff syndrome due to alcohol use versus metabolic encephalopathy due to alcohol use    -Lactulose twice daily  -MRI brain  -CIWA protocol  -Wernicke dose thiamine  -Symptom triggered Ativan  -Folic acid  -P40 folate RPR and TSH  - neurology consulted      #Elevated D-dimer  #Suspected pulmonary infarct  #Left upper lobe solid lesion  -CTA with no large pulmonary embolism. Left upper lobe mass interspersed with air suggestive of pulmonary infarct. Suboptimal contrast  -Duplex no acute DVT  -Patient denies hemoptysis cough or chest pain    -Repeat CTA in 1 to 2 days with optimal contrast rule out PE contributing to pulmonary infarct  -Quant TB given recent immigration  -Ceftriaxone doxycycline to cover for infection        #Compensated cirrhosis  #Portal hypertension  -CT abdomen with portal hypertension and cirrhosis. Trace ascites  -Unknown etiology? Alcohol    -Check hepatitis panel  -Ultrasound liver  -Outpatient follow-up with hepatology    #Right ankle swelling and redness suspected cellulitis  -X-ray with chronic distal fibular and medial malleoli fracture and soft tissue swelling  -Continue antibiotic  -PT OT    #Thrombocytopenia due to cirrhosis  -Monitor platelets        DIET: ADULT DIET Regular; No Salt Added (3-4 gm);  Low Potassium (Less than 3000 mg/day); 1800 ml   ISOLATION PRECAUTIONS: There are currently no Active Isolations  CODE STATUS: Full Code   DVT PROPHYLAXIS: Lovenox  FUNCTIONAL STATUS PRIOR TO HOSPITALIZATION: Fully active and ambulatory; able to carry on all self-care without restriction. Ambulatory status/function: By self   EARLY MOBILITY ASSESSMENT: Recommend routine ambulation while hospitalized with the assistance of nursing staff  ANTICIPATED DISCHARGE: Greater than 48 hours. ANTICIPATED DISPOSITION: Home  EMERGENCY CONTACT/SURROGATE DECISION MAKER:     CRITICAL CARE WAS PERFORMED FOR THIS ENCOUNTER: NO.      Signed By: Benji Gonsalez MD     March 16, 2023         Please note that this dictation may have been completed with Dragon, the computer voice recognition software. Quite often unanticipated grammatical, syntax, homophones, and other interpretive errors are inadvertently transcribed by the computer software. Please disregard these errors. Please excuse any errors that have escaped final proofreading.

## 2023-03-16 NOTE — ED NOTES
Pt returned from CT, unable to get imaging competed for second attempt d/t IV blowing for second time. Pt now back in bed and resting quietly. Will allow pt to continue to rest.  Will attempt IV access again before CT machine back up and running.

## 2023-03-16 NOTE — ED NOTES
Shift change report given to Lola Alcaraz RN. Report included review of SBAR, accordion report, results review, orders, meds, ROS, and POC. Pt due for IV access insertion and completion of CT imaging. All questions answered. Transfer of care complete.

## 2023-03-16 NOTE — ED NOTES
TRANSFER - OUT REPORT:    Verbal report given to Stefano N Marcela Bolton (name) on Porsha Fsiher  being transferred to [facility] surg (unit) for routine progression of care       Report consisted of patients Situation, Background, Assessment and   Recommendations(SBAR). Information from the following report(s) SBAR and ED Summary was reviewed with the receiving nurse. Lines:   Peripheral IV 66/23/41 Left;Upper Basilic (Active)   Site Assessment Clean, dry, & intact 03/16/23 1549   Phlebitis Assessment 0 03/16/23 1549   Infiltration Assessment 0 03/16/23 1549   Dressing Status Clean, dry, & intact 03/16/23 1549   Dressing Type Transparent 03/16/23 1549   Hub Color/Line Status Pink;Flushed 03/16/23 1549   Action Taken Dressing reinforced 03/16/23 1549        Opportunity for questions and clarification was provided.       Patient transported with:   Monitor

## 2023-03-16 NOTE — ED PROVIDER NOTES
[facility] EMERGENCY DEPT  EMERGENCY DEPARTMENT ENCOUNTER       Pt Name: Risa Chawla  MRN: [id number]  [place] 1975  Date of evaluation: 3/16/2023  Provider: Orlando Vyas MD   PCP: None  Note Started: 3:11 AM 3/16/23     CHIEF COMPLAINT       Chief Complaint   Patient presents with    Leg Pain     Right leg pain, swelling        HISTORY OF PRESENT ILLNESS: 1 or more elements      History From: patient, History limited by:  Sudanese-speaking. History done with the assistance of Sudanese-speaking tech. Also patient speaks some [place] and speaks in [place] Risa Chawla is a 52 y.o. male who presents ambulatory via EMS complaining of right ankle and right foot pain which he initially states started several days ago (then explained it started weeks ago). Patient is without other complaints. Denies recent trauma, shortness of breath, chest pain. He is from [facility] and claims that he just came to this country by plane last Wednesday. When asked regarding address gives his current home as \"[address] \"  Apparently he was at [address] [place] [address] earlier this evening and given resources for shelters. After he left [place] he was found [place] [address] [place] [address] police who called [facility] who brought him here because he was complaining of foot pain. .  Patient denies medical history or known medical problems. Nursing Notes were all reviewed and agreed with or any disagreements were addressed in the HPI. REVIEW OF SYSTEMS        Positives and Pertinent negatives as per HPI. PAST HISTORY     Past Medical History:  History reviewed. No pertinent past medical history. Past Surgical History:  History reviewed. No pertinent surgical history. Family History:  History reviewed. No pertinent family history.     Social History:  Social History     Tobacco Use    Smoking status: Never    Smokeless tobacco: Never   Substance Use Topics    Drug use: Never       Allergies:  No Known Allergies    CURRENT MEDICATIONS      Previous Medications ACETAMINOPHEN (TYLENOL) 325 MG TABLET    Take 650 mg by mouth every four (4) hours as needed for Pain. MULTIVITAMIN (ONE A DAY) TABLET    Take 1 Tablet by mouth daily. SCREENINGS               No data recorded         PHYSICAL EXAM    Patient Vitals for the past 24 hrs:   Temp Pulse Resp BP SpO2   03/16/23 1450 -- 87 25 102/61 --   03/16/23 1350 -- 84 22 104/65 --   03/16/23 1335 -- 79 20 106/67 --   03/16/23 1320 -- 87 23 120/75 97 %   03/16/23 1305 -- 69 22 130/70 98 %   03/16/23 1250 -- 77 23 93/75 96 %   03/16/23 1235 -- 68 22 122/66 98 %   03/16/23 1220 -- 62 21 -- 98 %   03/16/23 1205 -- 63 22 -- 98 %   03/16/23 1150 -- 69 24 -- 94 %   03/16/23 1135 -- 69 25 -- 98 %   03/16/23 1106 -- 64 20 -- 98 %   03/16/23 1105 -- 66 17 -- 98 %   03/16/23 1051 -- 67 19 -- 98 %   03/16/23 1050 -- 71 19 -- 99 %   03/16/23 1036 -- 63 20 -- 99 %   03/16/23 1021 -- 66 21 -- 97 %   03/16/23 1006 -- 70 20 -- 100 %   03/16/23 0921 -- 69 16 -- --   03/16/23 0915 -- 68 21 120/85 --   03/16/23 0845 -- 65 17 -- 98 %   03/16/23 0759 -- 73 19 -- 98 %   03/16/23 0744 -- 80 22 -- 95 %   03/16/23 0729 -- -- -- 115/74 --   03/16/23 0541 99.1 °F (37.3 °C) 77 16 115/78 97 %   03/16/23 0309 100.2 °F (37.9 °C) 89 18 132/89 99 %        Physical Exam  Constitutional:       General: He is not in acute distress. Appearance: He is not ill-appearing, toxic-appearing or diaphoretic. Comments: Malodorous and bundled in multiple layers of clothing. Cardiovascular:      Rate and Rhythm: Normal rate. Pulmonary:      Effort: Pulmonary effort is normal.      Breath sounds: Normal breath sounds. Musculoskeletal:      Right lower leg: Edema (1-2+ pitting edema right lower leg) present. Comments: Patient has generalized right ankle tenderness and also right posterior calf tenderness.    Skin:     Comments: Darkening of skin to right lower extremity  Patient has ulceration to bilateral feet        DIAGNOSTIC RESULTS   LABS:     Recent Results (from the past 12 hour(s))   D DIMER    Collection Time: 03/16/23  3:51 AM   Result Value Ref Range    D-dimer 19.54 (H) 0.00 - 0.65 mg/L FEU   COVID-19 WITH INFLUENZA A/B    Collection Time: 03/16/23  6:47 AM   Result Value Ref Range    SARS-CoV-2 by PCR Not detected NOTD      Influenza A by PCR Not detected      Influenza B by PCR Not detected     URINALYSIS W/ RFLX MICROSCOPIC    Collection Time: 03/16/23  6:47 AM   Result Value Ref Range    Color DARK YELLOW      Appearance CLEAR CLEAR      Specific gravity 1.025      pH (UA) 6.5 5.0 - 8.0      Protein Negative NEG mg/dL    Glucose Negative NEG mg/dL    Ketone TRACE (A) NEG mg/dL    Blood Negative NEG      Urobilinogen >8.0 (H) 0.2 - 1.0 EU/dL    Nitrites Negative NEG      Leukocyte Esterase TRACE (A) NEG     DRUG SCREEN, URINE    Collection Time: 03/16/23  6:47 AM   Result Value Ref Range    AMPHETAMINES Negative NEG      BARBITURATES Negative NEG      BENZODIAZEPINES Positive (A) NEG      COCAINE Negative NEG      METHADONE Negative NEG      OPIATES Negative NEG      PCP(PHENCYCLIDINE) Negative NEG      THC (TH-CANNABINOL) Negative NEG      Drug screen comment (NOTE)    BILIRUBIN, CONFIRM    Collection Time: 03/16/23  6:47 AM   Result Value Ref Range    Bilirubin UA, confirm Negative NEG     URINE MICROSCOPIC ONLY    Collection Time: 03/16/23  6:47 AM   Result Value Ref Range    WBC 0-4 0 - 4 /hpf    RBC 0-5 0 - 5 /hpf    Epithelial cells FEW FEW /lpf    Bacteria Negative NEG /hpf   AMMONIA    Collection Time: 03/16/23  2:17 PM   Result Value Ref Range    Ammonia, plasma 56 (H) <32 UMOL/L        EKG: If performed, independent interpretation documented below in the MDM section     RADIOLOGY:  Non-plain film images such as CT, Ultrasound and MRI are read by the radiologist. Plain radiographic images are visualized and preliminarily interpreted by the ED Provider with the findings documented in the MDM section.      Interpretation per the Radiologist below, if available at the time of this note:     XR ANKLE RT MIN 3 V    Result Date: 3/16/2023  EXAM: XR ANKLE RT MIN 3 V INDICATION: pain. COMPARISON: None. FINDINGS: Three views of the right ankle demonstrate no acute fracture or disruption of the ankle mortise. Chronic healed appearing fracture deformity of the distal fibula and incompletely or ununited fracture deformity of the medial malleolus is noted. There is broad ankle soft tissue swelling. There is no other acute osseous or articular abnormality. The soft tissues are otherwise within normal limits. Chronic distal fibular and medial malleoli fractures with soft tissue swelling. CT HEAD WO CONT    Result Date: 3/16/2023  EXAM: CT HEAD WO CONT INDICATION: Altered mental status COMPARISON: None. CONTRAST: None. TECHNIQUE: Unenhanced CT of the head was performed using 5 mm images. Brain and bone windows were generated. Coronal and sagittal reformats. CT dose reduction was achieved through use of a standardized protocol tailored for this examination and automatic exposure control for dose modulation. FINDINGS: There is no extra-axial fluid collection hemorrhage shift or masses her. There is moderate atrophy for age. No acute findings. Suspect prominent atrophy. CTA CHEST W OR W WO CONT    Result Date: 3/16/2023  CT ANGIOGRAPHY CHEST and CT OF THE ABDOMEN AND PELVIS WITH CONTRAST. 3/16/2023 11:32 AM INDICATION: Elevated d-dimer, concern for malignancy. Right leg pain and swelling. COMPARISON: None. TECHNIQUE: CT angiography of the chest was performed after the administration of 100 cc IV contrast (Isovue 370). Coronal and sagittal, and coronal MIP reconstructions were performed. CT of the abdomen and pelvis was performed after the same IV contrast administration. CT dose reduction was achieved through use of a standardized protocol tailored for this examination and automatic exposure control for dose modulation.  FINDINGS: Chest: The contrast bolus is suboptimal, with equal contrast in the systemic and pulmonary arteries. There is no large central pulmonary embolism in the main or lobar pulmonary arteries, . The segmental and subsegmental pulmonary arteries are suboptimally evaluated. A low-density mass, interspersed with air, and the subpleural aspect of the anterior left upper lobe, measures 23 x 17 x 33 mm. While there is no visible pulmonary embolism, its morphology suggests pulmonary infarction. The lungs are hypoinflated and there is right basilar atelectasis. The central airways are patent. No pneumothorax or pleural effusion. The heart is enlarged. No thoracic lymphadenopathy. Minimal gynecomastia is symmetric. Abdomen: The liver is cirrhotic, with surface nodularity, widening of the fissures, hypertrophy of the caudate, and hypertrophy of the left lateral segment. Portal hypertension is evidenced by splenomegaly, small paraesophageal and paragastric portosystemic collaterals, and probable recanalization of the umbilical vein. There is trace right subdiaphragmatic and paracolic ascites. Incidental note is made of small gallstones. A small cystic focus in the pancreatic body is likely a sidebranch intraductal papillary mucinous neoplasm (IPMN). The duodenum and adrenals are normal. The kidneys are simultaneously and nephrographic and excretory phase, indicating a split contrast injection. There is excreted contrast in the normal bladder as well. Pelvis: The small bowel, ileocecal junction, appendix, and colon are normal. No free air and no abdominopelvic lymphadenopathy. 1. Suboptimal contrast bolus. No large central PE. 2. Low-density left upper lobe mass, interspersed with air, suggests a pulmonary infarction. 3. Cirrhosis and portal hypertension. Trace ascites. CT ABD PELV W CONT    Result Date: 3/16/2023  CT ANGIOGRAPHY CHEST and CT OF THE ABDOMEN AND PELVIS WITH CONTRAST.  3/16/2023 11:32 AM INDICATION: Elevated d-dimer, concern for malignancy. Right leg pain and swelling. COMPARISON: None. TECHNIQUE: CT angiography of the chest was performed after the administration of 100 cc IV contrast (Isovue 370). Coronal and sagittal, and coronal MIP reconstructions were performed. CT of the abdomen and pelvis was performed after the same IV contrast administration. CT dose reduction was achieved through use of a standardized protocol tailored for this examination and automatic exposure control for dose modulation. FINDINGS: Chest: The contrast bolus is suboptimal, with equal contrast in the systemic and pulmonary arteries. There is no large central pulmonary embolism in the main or lobar pulmonary arteries, . The segmental and subsegmental pulmonary arteries are suboptimally evaluated. A low-density mass, interspersed with air, and the subpleural aspect of the anterior left upper lobe, measures 23 x 17 x 33 mm. While there is no visible pulmonary embolism, its morphology suggests pulmonary infarction. The lungs are hypoinflated and there is right basilar atelectasis. The central airways are patent. No pneumothorax or pleural effusion. The heart is enlarged. No thoracic lymphadenopathy. Minimal gynecomastia is symmetric. Abdomen: The liver is cirrhotic, with surface nodularity, widening of the fissures, hypertrophy of the caudate, and hypertrophy of the left lateral segment. Portal hypertension is evidenced by splenomegaly, small paraesophageal and paragastric portosystemic collaterals, and probable recanalization of the umbilical vein. There is trace right subdiaphragmatic and paracolic ascites. Incidental note is made of small gallstones. A small cystic focus in the pancreatic body is likely a sidebranch intraductal papillary mucinous neoplasm (IPMN). The duodenum and adrenals are normal. The kidneys are simultaneously and nephrographic and excretory phase, indicating a split contrast injection. There is excreted contrast in the normal bladder as well. Pelvis: The small bowel, ileocecal junction, appendix, and colon are normal. No free air and no abdominopelvic lymphadenopathy. 1. Suboptimal contrast bolus. No large central PE. 2. Low-density left upper lobe mass, interspersed with air, suggests a pulmonary infarction. 3. Cirrhosis and portal hypertension. Trace ascites.         PROCEDURES   Unless otherwise noted below, none  Critical Care  Performed by: Vinod Lozano MD  Authorized by: Vinod Lozano MD     Critical care provider statement:     Critical care time (minutes):  85    Critical care time was exclusive of:  Separately billable procedures and treating other patients    Critical care was necessary to treat or prevent imminent or life-threatening deterioration of the following conditions:  CNS failure or compromise and metabolic crisis    Critical care was time spent personally by me on the following activities:  Blood draw for specimens, discussions with consultants, ordering and performing treatments and interventions, ordering and review of laboratory studies, ordering and review of radiographic studies, re-evaluation of patient's condition, vascular access procedures and examination of patient    I assumed direction of critical care for this patient from another provider in my specialty: yes         EMERGENCY DEPARTMENT COURSE and DIFFERENTIAL DIAGNOSIS/MDM   Vitals:    Vitals:    03/16/23 1320 03/16/23 1335 03/16/23 1350 03/16/23 1450   BP: 120/75 106/67 104/65 102/61   Pulse: 87 79 84 87   Resp: 23 20 22 25   Temp:       SpO2: 97%      Weight:       Height:            Patient was given the following medications:  Medications   sodium chloride (NS) flush 5-40 mL (has no administration in time range)   sodium chloride (NS) flush 5-40 mL (has no administration in time range)   acetaminophen (TYLENOL) tablet 650 mg (has no administration in time range)     Or   acetaminophen (TYLENOL) suppository 650 mg (has no administration in time range)   polyethylene glycol (MIRALAX) packet 17 g (has no administration in time range)   ondansetron (ZOFRAN ODT) tablet 4 mg (has no administration in time range)     Or   ondansetron (ZOFRAN) injection 4 mg (has no administration in time range)   enoxaparin (LOVENOX) injection 40 mg (has no administration in time range)   lactulose (CHRONULAC) 10 gram/15 mL solution 15 mL (has no administration in time range)   cephALEXin (KEFLEX) capsule 500 mg (has no administration in time range)   thiamine (B-1) 400 mg in 0.9% sodium chloride 50 mL IVPB (has no administration in time range)   LORazepam (ATIVAN) injection 1 mg (has no administration in time range)   LORazepam (ATIVAN) injection 2 mg (has no administration in time range)   folic acid (FOLVITE) tablet 1 mg (has no administration in time range)   ketorolac (TORADOL) injection 30 mg (30 mg IntraVENous Given 3/16/23 0330)   iopamidoL (ISOVUE-370) 370 mg iodine /mL (76 %) injection 100 mL (100 mL IntraVENous Given 3/16/23 1133)       Medical Decision Making  Skin changes of right lower extremity possibly consistent with chronic venous stasis, but there is concern for DVT and/or possibly cellulitis. Needs D-dimer and if elevated will do venous Doppler. Patient has clear lungs and denies dyspnea or chest pain, so low suspicion at this point for PE or CHF. The foot ulceration appears to be consistent with prolonged wearing of his shoes and socks. Denies history of diabetes and blood sugar prehospital was 138, so unlikely diabetic foot wound. Will check labs to eval for signs of systemic infection, gwendolyn, congestive hepatopathy, dvt. Amount and/or Complexity of Data Reviewed  Labs: ordered. Radiology: ordered. ECG/medicine tests: ordered. Risk  OTC drugs. Prescription drug management. Decision regarding hospitalization.       **PLEASE SEE ED COURSE DETAILS BELOW FOR FURTHER MDM DETAILS:  ED Course as of 03/16/23 1540   Thu Mar 16, 2023   2715 Awaiting D-dimer. Lab work suggests changes of chronic alcoholism. [SS]   1754 Patient is inconsistent with his story. Initially denied alcohol, then when pressed stating his last drink was 3 weeks ago. On initial history denies shortness of breath been admitted to shortness of breath. Given his severely elevated D-dimer and presentation of leg pain and swelling, needs a Doppler to eval for DVT. With that degree of D-dimer elevation, its possible he could have an underlying malignancy. Given his low-grade temp and possible dyspnea, will CT chest to eval for PE or malignancy. Patient has elevated bili and LFTs. Again, given limited history and concerning lab abnormalities will CT abdomen to look for source of low-grade temp, possible malignancy (given elevated d-dimer), mass (given lower extremity edema), biliary/hepatic issues. Given low white blood cell count and low-grade temp, as well as history of recent airplane travel, will add on COVID test. [SS]   0609 Was just informed CT scanner is down until 8am; consequently, there will be a delay getting Mr. Cano scanned. In the interim will pursue venous doppler, covid and urine testing.   [SS]   0567 Spoke with vascular tech. Doppler of RLE neg for dvt. [SS]   0781 Patient reassessed prior to change of shift. He is resting comfortably and asking for the remote. Denies pain. Care signed out to Dr. Feng Edge who will follow-up on CT results [SS]   0265 Received signout on this patient. Patient lost IV access. Procedure Note- Peripheral IV Access    Performed by: MD Nic Solano MD gained IV access using a 20 gauge needle because the patient had no vascular access. After cleaning the site with alcohol prep, the Right basilic vein was localized with ultrasound guidance in an anterior approach. Line confirmation was obtained by direct visualization and good blood return. No anaesthetic was used.   The line was successfully flushed with normal saline and was secured with transparent tape. Estimated blood loss: 2mL  The procedure took 15 minutes, and pt tolerated well. [MS]   3307 CTA chest abdomen pelvis shows no large vessel pulmonary embolism but does show some concerning findings for pulmonary infarct. Patient does complain of chest pain but no shortness of breath. CT also reveals multiple gallstones but no signs of cholecystitis. Will discuss with the hospitalist for admission. [MS]   1335 1:35 PM  Jorge L Reyes MD spoke with Dr. Josef Gillespie, Consult for Hospitalist. Discussed HPI and PE, available diagnostic tests and clinical findings. He is in agreement with care plans as outlined. He has reviewed patient's labs and imaging and is requesting an ammonia level prior to being willing to admit. If the ammonia level is elevated, he will need to be transferred to [facility] for hepatology. If the ammonia level is unremarkable, patient can be admitted here. [MS]   7508 Ammonia level is 56, patient can be admitted to inpatient medicine here. [MS]   4907 Nursing has informed me that his peripheral IV does not work and it appears to be infiltrated. Will look in the left arm for a peripheral IV with ultrasound. [MS]   [id number] Procedure Note- Peripheral IV Access  3:38 PM  Performed by: MD Jorge L Pascal MD gained IV access using a 20 gauge needle because the patient had no vascular access. After cleaning the site with alcohol prep, the left basilic vein was localized with ultrasound guidance in an anterior approach. Line confirmation was obtained by direct visualization and good blood return. No anaesthetic was used. The line was successfully flushed with normal saline and was secured with transparent tape. Estimated blood loss: 2mL  The procedure took 15 minutes, and pt tolerated well. [MS]      ED Course User Index  [MS] Madisyn Calix MD  [SS] Fatuma Turcios MD     FINAL IMPRESSION     1.  Closed fracture of right ankle, sequela    2. Edema of right lower extremity    3. Cellulitis of right lower extremity    4. Calculus of gallbladder without cholecystitis without obstruction    5. Pulmonary infarction ([facility] [address]    6. Hepatic cirrhosis, unspecified hepatic cirrhosis type, unspecified whether ascites present [facility])          DISPOSITION/PLAN   ADMIT NOTE:  2:42 PM  The patient is being admitted to the hospital by Dr. Flory Mabry. The results of their tests and reasons for their admission have been discussed with the patient and/or available family. They convey agreement and understanding for the need to be admitted and for their admission diagnosis. I am the Primary Clinician of Record. Justin Vera MD (electronically signed)    (Please note that parts of this dictation were completed with voice recognition software. Quite often unanticipated grammatical, syntax, homophones, and other interpretive errors are inadvertently transcribed by the computer software. Please disregards these errors.  Please excuse any errors that have escaped final proofreading.)

## 2023-03-16 NOTE — ED NOTES
Doppler of RLE completed; negative for DVT per ultrasound tech. Pt now due to return to CT to complete imaging.

## 2023-03-16 NOTE — ROUTINE PROCESS
@6570 reattempt flush with new IV. still no good pt sent back. CT is now down. ... @8734 IV no good attempts to start another one in CT . needs ultrasound placement. RN to call when ready. Reason for Call:  Medication or medication refill:Spinosad 0.9 % SUSP    Do you use a [facility]?  Name of the pharmacy and phone number for the current request:  [facility] [place]    Name of the medication requested: SPINOSAD 0.9% SUSP    Can we leave a detailed message on this number? YES    Phone number patient can be reached at: Home number on file [phone] (home)    Best Time: ANY    Call taken on 2/11/2022 at 11:17 AM by Telma Carrington

## 2023-03-16 NOTE — ED NOTES
Emergency Department Nursing Plan of Care       The Nursing Plan of Care is developed from the Nursing assessment and Emergency Department Attending provider initial evaluation. The plan of care may be reviewed in the ED Provider note.     The Plan of Care was developed with the following considerations:   Patient / Family readiness to learn indicated by:verbalized understanding  Persons(s) to be included in education: patient  Barriers to Learning/Limitations:Welsh    Signed     Boris Levels    3/16/2023   3:21 AM

## 2023-03-16 NOTE — PROGRESS NOTES
1825: Unsuccessful to draw blood x2. Juanita RNS to assist.    2776: RNS Juanita unsuccessful to draw blood/initiate IV x2 attempts. 1850: Art stick for labs, central line ordered however anesthesia will not see pt until tomorrow. 1915: Art stick labs sent. Pt consented to central line placement. Unable to sign due to lethargy, tremulous extremities. Dual nurse witness completed.

## 2023-03-16 NOTE — PROGRESS NOTES
18)  services [id number] ID) used for communication with patient to discuss admission database, Carol Gallegos, assessment   1753) [facility] Dr. Riddhi Pope-- Pt CIWA 14---visual and auditory hallucinations. Pt having double vision. Would you like to upgrade him to PCU?   1804) [facility] Dr. Riddhi Pope-- Do you want to give him IV fluids? (not sure what his oral intake will be if he needs more ativan tonight) Also could he have a prn for severe pain if he's more alert? (Clarify--R leg 8/10 burning/numb pain)  1839) services [id number]( ID) used for permission for central line  1920) Bedside shift change report given to Ronnie James RN (oncoming nurse) by Anton Brooks RN (offgoing nurse). Report included the following information SBAR, Kardex, and MAR.

## 2023-03-16 NOTE — PROGRESS NOTES
1945: Bedside and Verbal shift change report given to Steph Main RN (oncoming nurse) by Christy Saldivar RN (offgoing nurse). Report included the following information SBAR, Kardex, MAR, and Recent Results. 1954: Interpretor #[id number] called in attempt to explain new nurse for the night for assessment and to explain condom cath. Patient to drowsy at this time. Will try again when more alert. 2000: Condom cath placed on patient with assistance of Christy Saldivar RN.     2149: Amaya Bernabe NP the following: \"Patient admitted early this morning for suspected cellulitis of RLE and AMS with a history of heavy ETOH use. His potassium was 3.4 on his lab work and it was not replaced today. He is currently very drowsy after receiving Ativan so I am not sure if he will be able to tolerate PO potassium. \"    2150: Reply from Nando Amaral NP: \"Ok will add iv\"    2216: Called interpretor to attempt to assess patient and get him to take his PO antibiotic. Patient still very drowsy and falling asleep during call. Will try again later. Interpretor #[id number]. Went ahead and held PO antibiotic due to drowsiness. 80: Called interpretor to conduct assessment. Interpretor #[id number].     5813: Bedside and Verbal shift change report given to Joshua Briscoe RN (oncoming nurse) by Steph Main RN (offgoing nurse). Report included the following information SBAR, Kardex, MAR, and Recent Results.

## 2023-03-17 ENCOUNTER — APPOINTMENT (OUTPATIENT)
Dept: MRI IMAGING | Age: 48
End: 2023-03-17
Attending: STUDENT IN AN ORGANIZED HEALTH CARE EDUCATION/TRAINING PROGRAM
Payer: MEDICAID

## 2023-03-17 LAB
ALBUMIN SERPL-MCNC: 2.5 G/DL (ref 3.5–5)
ALBUMIN/GLOB SERPL: 0.5 (ref 1.1–2.2)
ALP SERPL-CCNC: 86 U/L (ref 45–117)
ALT SERPL-CCNC: 38 U/L (ref 12–78)
ANION GAP SERPL CALC-SCNC: 8 MMOL/L (ref 5–15)
AST SERPL-CCNC: 73 U/L (ref 15–37)
BASOPHILS # BLD: 0 K/UL (ref 0–0.1)
BASOPHILS NFR BLD: 0 % (ref 0–1)
BILIRUB SERPL-MCNC: 2 MG/DL (ref 0.2–1)
BUN SERPL-MCNC: 5 MG/DL (ref 6–20)
BUN/CREAT SERPL: 8 (ref 12–20)
CALCIUM SERPL-MCNC: 7.7 MG/DL (ref 8.5–10.1)
CHLORIDE SERPL-SCNC: 102 MMOL/L (ref 97–108)
CO2 SERPL-SCNC: 26 MMOL/L (ref 21–32)
CREAT SERPL-MCNC: 0.62 MG/DL (ref 0.7–1.3)
DIFFERENTIAL METHOD BLD: ABNORMAL
EOSINOPHIL # BLD: 0.3 K/UL (ref 0–0.4)
EOSINOPHIL NFR BLD: 13 % (ref 0–7)
ERYTHROCYTE [DISTWIDTH] IN BLOOD BY AUTOMATED COUNT: 22.7 % (ref 11.5–14.5)
FOLATE SERPL-MCNC: 12.4 NG/ML (ref 5–21)
GLOBULIN SER CALC-MCNC: 5.1 G/DL (ref 2–4)
GLUCOSE SERPL-MCNC: 104 MG/DL (ref 65–100)
HAV IGM SER QL: NONREACTIVE
HBV CORE IGM SER QL: NONREACTIVE
HBV SURFACE AG SER QL: <0.1 INDEX
HBV SURFACE AG SER QL: NEGATIVE
HCT VFR BLD AUTO: 31.4 % (ref 36.6–50.3)
HCV AB SERPL QL IA: NONREACTIVE
HGB BLD-MCNC: 10.2 G/DL (ref 12.1–17)
IMM GRANULOCYTES # BLD AUTO: 0 K/UL
IMM GRANULOCYTES NFR BLD AUTO: 0 %
LYMPHOCYTES # BLD: 0.8 K/UL (ref 0.8–3.5)
LYMPHOCYTES NFR BLD: 34 % (ref 12–49)
MAGNESIUM SERPL-MCNC: 1.3 MG/DL (ref 1.6–2.4)
MCH RBC QN AUTO: 27.5 PG (ref 26–34)
MCHC RBC AUTO-ENTMCNC: 32.5 G/DL (ref 30–36.5)
MCV RBC AUTO: 84.6 FL (ref 80–99)
MONOCYTES # BLD: 0.4 K/UL (ref 0–1)
MONOCYTES NFR BLD: 18 % (ref 5–13)
NEUTS SEG # BLD: 0.8 K/UL (ref 1.8–8)
NEUTS SEG NFR BLD: 35 % (ref 32–75)
NRBC # BLD: 0 K/UL (ref 0–0.01)
NRBC BLD-RTO: 0 PER 100 WBC
PLATELET # BLD AUTO: 60 K/UL (ref 150–400)
PMV BLD AUTO: 10.2 FL (ref 8.9–12.9)
POTASSIUM SERPL-SCNC: 3.3 MMOL/L (ref 3.5–5.1)
PROT SERPL-MCNC: 7.6 G/DL (ref 6.4–8.2)
RBC # BLD AUTO: 3.71 M/UL (ref 4.1–5.7)
RBC MORPH BLD: ABNORMAL
RPR SER QL: NONREACTIVE
SODIUM SERPL-SCNC: 136 MMOL/L (ref 136–145)
SP1: NORMAL
SP2: NORMAL
SP3: NORMAL
VIT B12 SERPL-MCNC: 732 PG/ML (ref 193–986)
VIT B12 SERPL-MCNC: 761 PG/ML (ref 193–986)
WBC # BLD AUTO: 2.3 K/UL (ref 4.1–11.1)

## 2023-03-17 PROCEDURE — 65270000032 HC RM SEMIPRIVATE

## 2023-03-17 PROCEDURE — 86480 TB TEST CELL IMMUN MEASURE: CPT

## 2023-03-17 PROCEDURE — 36415 COLL VENOUS BLD VENIPUNCTURE: CPT

## 2023-03-17 PROCEDURE — 95816 EEG AWAKE AND DROWSY: CPT | Performed by: INTERNAL MEDICINE

## 2023-03-17 PROCEDURE — C1751 CATH, INF, PER/CENT/MIDLINE: HCPCS

## 2023-03-17 PROCEDURE — 97161 PT EVAL LOW COMPLEX 20 MIN: CPT | Performed by: PHYSICAL THERAPIST

## 2023-03-17 PROCEDURE — 74011250636 HC RX REV CODE- 250/636: Performed by: STUDENT IN AN ORGANIZED HEALTH CARE EDUCATION/TRAINING PROGRAM

## 2023-03-17 PROCEDURE — 74011250637 HC RX REV CODE- 250/637: Performed by: STUDENT IN AN ORGANIZED HEALTH CARE EDUCATION/TRAINING PROGRAM

## 2023-03-17 PROCEDURE — 80053 COMPREHEN METABOLIC PANEL: CPT

## 2023-03-17 PROCEDURE — 74011250636 HC RX REV CODE- 250/636: Performed by: NURSE PRACTITIONER

## 2023-03-17 PROCEDURE — 77030020365 HC SOL INJ SOD CL 0.9% 50ML

## 2023-03-17 PROCEDURE — 85025 COMPLETE CBC W/AUTO DIFF WBC: CPT

## 2023-03-17 PROCEDURE — 74011250636 HC RX REV CODE- 250/636: Performed by: INTERNAL MEDICINE

## 2023-03-17 PROCEDURE — 74011000258 HC RX REV CODE- 258: Performed by: STUDENT IN AN ORGANIZED HEALTH CARE EDUCATION/TRAINING PROGRAM

## 2023-03-17 PROCEDURE — 82607 VITAMIN B-12: CPT

## 2023-03-17 PROCEDURE — 74011000250 HC RX REV CODE- 250: Performed by: STUDENT IN AN ORGANIZED HEALTH CARE EDUCATION/TRAINING PROGRAM

## 2023-03-17 PROCEDURE — 76937 US GUIDE VASCULAR ACCESS: CPT

## 2023-03-17 PROCEDURE — 70551 MRI BRAIN STEM W/O DYE: CPT

## 2023-03-17 PROCEDURE — 83735 ASSAY OF MAGNESIUM: CPT

## 2023-03-17 RX ORDER — MAGNESIUM SULFATE HEPTAHYDRATE 40 MG/ML
2 INJECTION, SOLUTION INTRAVENOUS ONCE
Status: COMPLETED | OUTPATIENT
Start: 2023-03-17 | End: 2023-03-17

## 2023-03-17 RX ADMIN — DOXYCYCLINE HYCLATE 100 MG: 100 TABLET, COATED ORAL at 21:10

## 2023-03-17 RX ADMIN — DOXYCYCLINE HYCLATE 100 MG: 100 TABLET, COATED ORAL at 09:24

## 2023-03-17 RX ADMIN — LACTULOSE 15 ML: 20 SOLUTION ORAL at 18:35

## 2023-03-17 RX ADMIN — POTASSIUM CHLORIDE 10 MEQ: 7.46 INJECTION, SOLUTION INTRAVENOUS at 01:59

## 2023-03-17 RX ADMIN — POTASSIUM CHLORIDE 10 MEQ: 7.46 INJECTION, SOLUTION INTRAVENOUS at 00:46

## 2023-03-17 RX ADMIN — MAGNESIUM SULFATE HEPTAHYDRATE 2 G: 40 INJECTION, SOLUTION INTRAVENOUS at 11:25

## 2023-03-17 RX ADMIN — SODIUM CHLORIDE, POTASSIUM CHLORIDE, SODIUM LACTATE AND CALCIUM CHLORIDE 100 ML/HR: 600; 310; 30; 20 INJECTION, SOLUTION INTRAVENOUS at 05:42

## 2023-03-17 RX ADMIN — LACTULOSE 15 ML: 20 SOLUTION ORAL at 09:24

## 2023-03-17 RX ADMIN — CEFTRIAXONE SODIUM 1 G: 1 INJECTION, POWDER, FOR SOLUTION INTRAMUSCULAR; INTRAVENOUS at 16:29

## 2023-03-17 RX ADMIN — SODIUM CHLORIDE, PRESERVATIVE FREE 10 ML: 5 INJECTION INTRAVENOUS at 05:38

## 2023-03-17 RX ADMIN — FOLIC ACID 1 MG: 1 TABLET ORAL at 09:24

## 2023-03-17 RX ADMIN — ACETAMINOPHEN 650 MG: 325 TABLET ORAL at 23:47

## 2023-03-17 RX ADMIN — THIAMINE HYDROCHLORIDE 400 MG: 100 INJECTION, SOLUTION INTRAMUSCULAR; INTRAVENOUS at 21:10

## 2023-03-17 RX ADMIN — SODIUM CHLORIDE, PRESERVATIVE FREE 10 ML: 5 INJECTION INTRAVENOUS at 14:47

## 2023-03-17 RX ADMIN — SODIUM CHLORIDE, PRESERVATIVE FREE 10 ML: 5 INJECTION INTRAVENOUS at 21:11

## 2023-03-17 RX ADMIN — THIAMINE HYDROCHLORIDE 400 MG: 100 INJECTION, SOLUTION INTRAMUSCULAR; INTRAVENOUS at 05:38

## 2023-03-17 RX ADMIN — THIAMINE HYDROCHLORIDE 400 MG: 100 INJECTION, SOLUTION INTRAMUSCULAR; INTRAVENOUS at 14:47

## 2023-03-17 NOTE — WOUND CARE
WOUND Note:     New consult for Redness on right buttocks    Chart shows:  Admitted for AMS, compensated cirrhosis, portal hypertension, R ankle swelling/redness suspected cellulitis, thrombocytopenia due to cirrhosis, elevated d-dimer, suspected pulmonary infarct, left upper lobe solid lesion on 3/16/23  History of heavy alcohol use    Assessment:   A&O x 3  and reports pain on right ankle  Continent. Surface:  Foam mattress    Bilateral heels intact and without erythema. Buttocks and sacrum intact without erythema    1. POA Redness/discoloration to right inner buttocks   Approximately 4.0 x 2.5 x  0.0 cm  pink blanchable area. Intact skin. Periwound hyperpigmented. TX: Silicone cream to provide a moisture barrier as needed. Recommendations:    Monitor and assist with pericare and apply silicone cream to buttocks as needed. PI Prevention:  Patient ambulatory, slow steady gait. Able to turn independently. Kaleb Score - 17. No current risk for PI. Continue to monitor and do weekly skin assessments.     Discussed with Jose Coyne RN    Transition of Care: Plan to follow weekly and as needed while admitted to hospital.      Nella Kyle [phone]

## 2023-03-17 NOTE — PROGRESS NOTES
[address] Adult  Hospitalist Group                                                                                          Hospitalist Progress Note  Harley Love MD  Answering service: [phone] from in house phone        Date of Service:  3/17/2023  NAME:  Alis Keane  :  1975  MRN:  [id number]       Admission Summary:   Per HPI: Alis Keane is a 52 y.o. male who presents with right ankle and right foot pain for several days ago. Patient is a Qatari-speaking patient had a good historian most of the history was provided by Qatari-speaking RN at bedside and translating services. Patient is complaining of pain in right foot and right leg. Denied any nausea vomiting abdominal pain. Patient is only AAO x1 cannot tell time date or where he is going on to tell his name PT patient states that he for past 1 year has been drinking really heavily but has not had any drink for past 1 month. Endorsed that he was in sharing.it [place] but he got endoscopy and foot repair. Patient denies taking any medication at home. Denies tobacco use any decrease in drug use       Interval history / Subjective: Follow up the patient admitted for treatment of confusion, cellulitis of right leg  NAD, acute events over night noted  Pain RLE improved  The patient overall is feeling better  Alert and oriented to name, knows he is in hospital, aware about month  He is from [place] and was in [place] for 2 year on work related visa. 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 Record form [employer] hospital was requested  The patient denies having seizure and had last alcohol 3 weeks ago  The patient admits having right ankle fracture for 2 years, he had cast or splint for 1 year and it was removed 1 year ago. He is able to ambulate and put weight to his right foot.    MRI brain and neurology evaluation pending. Condition of the patient and plan of care was discussed with RN, CM, pharmacy during morning rounds. CT chest was reviewed  Telemetry was reviewed independently: NSR       Assessment & Plan: Altered mental status, improved  History of heavy alcohol use  -CT head negative for acute process. UDS positive benzodiazepine  -Ammonia 56  -Differentials include hepatic encephalopathy versus Wernicke's Korsakoff syndrome due to alcohol use versus metabolic encephalopathy due to alcohol use     -Lactulose twice daily  -MRI brain pending  -[facility] protocol  -Wernicke dose thiamine  -Symptoms were triggered by Ativan  -Folic acid  -V60, folate and TSH: WNL, RPR pending  - neurology consulted  EEG requested        Elevated D-dimer  Suspected pulmonary infarct  ? Left upper lobe solid lesion  -CTA with no large pulmonary embolism. Left upper lobe mass interspersed with air suggestive of pulmonary infarct. Suboptimal contrast  -Duplex no acute DVT  -Patient denies hemoptysis cough or chest pain     -Repeat CTA in 1 to 2 days with optimal contrast rule out PE contributing to pulmonary infarct  -Quant TB given recent immigration pending  -Continue Ceftriaxone and doxycycline to cover for infection           Compensated cirrhosis  Portal hypertension  Coagulopathy  -CT abdomen with portal hypertension and cirrhosis. Trace ascites  -Unknown etiology? Alcohol     -hepatitis panel negative  -Ultrasound liver  -Outpatient follow-up with hepatology     Right ankle swelling and redness suspected cellulitis  -X-ray with chronic distal fibular and medial malleoli fracture and soft tissue swelling  -Continue antibiotic  -PT OT   US RLE reviewed: no DVT    Right ankle Fx chronic  Per pt right ankle fracture for 2 years, he had cast or splint for 1 year and it was removed 1 year ago. He is able to ambulate and put weight to his right foot.    XR reviewed  The patient will need follow up ortho as outpt   Will need PT/OT evaluation       Thrombocytopenia due to liver cirrhosis  -Monitor platelets          Code status: Full code  Prophylaxis: SCD, hold Lovenox for now due to low platelets  Care Plan discussed with: patient, RN, CM  Anticipated Disposition: in 24-48 hrs  Inpatient  Cardiac monitoring: Telemetry     Hospital Problems  Never Reviewed            Codes Class Noted POA    AMS (altered mental status) ICD-10-CM: R41.82  ICD-9-CM: 780.97  3/16/2023 Unknown           Social Determinants of Health     Tobacco Use: Low Risk     Smoking Tobacco Use: Never    Smokeless Tobacco Use: Never    Passive Exposure: Not on file   Alcohol Use: Not on file   Financial Resource Strain: Not on file   Food Insecurity: Not on file   Transportation Needs: Not on file   Physical Activity: Not on file   Stress: Not on file   Social Connections: Not on file   Intimate Partner Violence: Not on file   Depression: Not on file   Housing Stability: Not on file       Review of Systems:   A comprehensive review of systems was negative except for that written in the HPI. Vital Signs:    Last 24hrs VS reviewed since prior progress note.  Most recent are:  Visit Vitals  BP (!) 115/58   Pulse 79   Temp 98.9 °F (37.2 °C)   Resp 21   Ht 5' 1.81\" (1.57 m)   Wt 79.4 kg (175 lb)   SpO2 100%   BMI 32.20 kg/m²         Intake/Output Summary (Last 24 hours) at 3/17/2023 1435  Last data filed at 3/17/2023 1620  Gross per 24 hour   Intake 2100 ml   Output 2125 ml   Net -25 ml        Physical Examination:     I had a face to face encounter with this patient and independently examined them on 3/17/2023 as outlined below:          General : alert x 2, awake, no acute distress, slow  HEENT: PEERL, EOMI, moist mucus membrane, TM clear  Neck: supple, no JVD, no meningeal signs  Chest: Coarse to auscultation bilaterally   CVS: S1 S2 heard, Capillary refill less than 2 seconds  Abd: soft/ non tender, non distended, BS physiological,   Ext: no clubbing, no cyanosis, + edema RLE, brisk 2+ DP pulses  Neuro/Psych: pleasant mood and affect, CN 2-12 grossly intact, sensory grossly within normal limit, Strength 5/5 in all extremities, DTR 1+ x 4  Skin: warm     Data Review:    Review and/or order of clinical lab test    US liver:  IMPRESSION     1. Cirrhotic liver morphology. No focal hepatic mass is seen. 2. Gallbladder wall thickening which may be related to the under distention and  chronic liver disease. 3. Normal sonographic appearance of the RIGHT kidney. US RLE: No evidence of deep vein thrombosis in the right lower extremity    I have personally and independently reviewed all pertinent labs, diagnostic studies, imaging, and have provided independent interpretation of the same. Labs:     Recent Labs     03/17/23 0339 03/16/23 0323   WBC 2.3* 3.1*   HGB 10.2* 10.2*   HCT 31.4* 31.2*   PLT 60* 66*     Recent Labs     03/17/23 0339 03/16/23 0323    135*   K 3.3* 3.4*    99   CO2 26 26   BUN 5* 12   CREA 0.62* 0.75   * 95   CA 7.7* 7.9*   MG 1.3* 1.0*     Recent Labs     03/17/23 0339 03/16/23  0323   ALT 38 50   AP 86 112   TBILI 2.0* 1.9*   TP 7.6 8.5*   ALB 2.5* 2.9*   GLOB 5.1* 5.6*     Recent Labs     03/16/23  1850   INR 1.6*   PTP 15.8*      No results for input(s): FE, TIBC, PSAT, FERR in the last 72 hours. Lab Results   Component Value Date/Time    Folate 12.4 03/16/2023 06:50 PM      No results for input(s): PH, PCO2, PO2 in the last 72 hours. No results for input(s): CPK, CKNDX, TROIQ in the last 72 hours.     No lab exists for component: CPKMB  No results found for: CHOL, CHOLX, CHLST, CHOLV, HDL, HDLP, LDL, LDLC, DLDLP, TGLX, TRIGL, TRIGP, CHHD, CHHDX  No results found for: [facility]  Lab Results   Component Value Date/Time    Color DARK YELLOW 03/16/2023 06:47 AM    Appearance CLEAR 03/16/2023 06:47 AM    Specific gravity 1.025 03/16/2023 06:47 AM    pH (UA) 6.5 03/16/2023 06:47 AM    Protein Negative 03/16/2023 06:47 AM    Glucose Negative 03/16/2023 06:47 AM    Ketone TRACE (A) 03/16/2023 06:47 AM    Urobilinogen >8.0 (H) 03/16/2023 06:47 AM    Nitrites Negative 03/16/2023 06:47 AM    Leukocyte Esterase TRACE (A) 03/16/2023 06:47 AM    Epithelial cells FEW 03/16/2023 06:47 AM    Bacteria Negative 03/16/2023 06:47 AM    WBC 0-4 03/16/2023 06:47 AM    RBC 0-5 03/16/2023 06:47 AM       Notes reviewed from all clinical/nonclinical/nursing services involved in patient's clinical care. Care coordination discussions were held with appropriate clinical/nonclinical/ nursing providers based on care coordination needs. Patients current active Medications were reviewed, considered, added and adjusted based on the clinical condition today. Home Medications were reconciled to the best of my ability given all available resources at the time of admission. Route is PO if not otherwise noted.       Admission Status: inpatient      Medications Reviewed:     Current Facility-Administered Medications   Medication Dose Route Frequency    sodium chloride (NS) flush 5-40 mL  5-40 mL IntraVENous Q8H    sodium chloride (NS) flush 5-40 mL  5-40 mL IntraVENous PRN    acetaminophen (TYLENOL) tablet 650 mg  650 mg Oral Q6H PRN    Or    acetaminophen (TYLENOL) suppository 650 mg  650 mg Rectal Q6H PRN    polyethylene glycol (MIRALAX) packet 17 g  17 g Oral DAILY PRN    ondansetron (ZOFRAN ODT) tablet 4 mg  4 mg Oral Q8H PRN    Or    ondansetron (ZOFRAN) injection 4 mg  4 mg IntraVENous Q6H PRN    [Held by provider] enoxaparin (LOVENOX) injection 40 mg  40 mg SubCUTAneous DAILY    lactulose (CHRONULAC) 10 gram/15 mL solution 15 mL  15 mL Oral BID    thiamine (B-1) 400 mg in 0.9% sodium chloride 50 mL IVPB  400 mg IntraVENous Q8H    LORazepam (ATIVAN) injection 1 mg  1 mg IntraVENous Q4H PRN    [Held by provider] LORazepam (ATIVAN) injection 2 mg  2 mg IntraVENous V5F PRN    folic acid (FOLVITE) tablet 1 mg  1 mg Oral DAILY    cefTRIAXone (ROCEPHIN) 1 g in sterile water (preservative free) 10 mL IV syringe  1 g IntraVENous Q24H    doxycycline (VIBRA-TABS) tablet 100 mg  100 mg Oral Q12H    [Held by provider] lactated Ringers infusion  100 mL/hr IntraVENous CONTINUOUS    morphine injection 2 mg  2 mg IntraVENous Q4H PRN     ______________________________________________________________________  EXPECTED LENGTH OF STAY: 2d 12h  ACTUAL LENGTH OF STAY:          1                 Mike Kraft MD

## 2023-03-17 NOTE — PROGRESS NOTES
Midline Insertion and Progress Note    PRE-PROCEDURE VERIFICATION  Correct Procedure: yes  Correct Site:  yes  Temperature: Temp: 99.5 °F (37.5 °C), Temperature Source: Temp Source: Oral  Recent Labs     03/17/23  0339 03/16/23  1850   BUN 5*  --    CREA 0.62*  --    PLT 60*  --    INR  --  1.6*   WBC 2.3*  --      Allergies: Patient has no known allergies. PROCEDURE DETAIL  A single lumen midline IV catheter was started for desire for reliable access. The following documentation is in addition to the Midline properties in the lines/airways flowsheet :  Xylocaine 1% used intradermally  Lot #: [id number]  Catheter Total Length: 14 (cm)  External Catheter Length: 0 (cm)  Circumference: 29 (cm)  Vein Selection for Midline :right basilic  Complication Related to Insertion: none    Line is okay to use.

## 2023-03-17 NOTE — CONSULTS
Neurology Consult    Patient ID:  Neida Patel  [id number]  52 y.o.  1975    Subjective:     Date of Consultation:  March 17, 2023    Referring Physician: Dr Sherri Menendez    Reason for Consultation:  AMS    History of Present Illness:   Neida Patel is a 52 y.o. man presenting to a [facility] on the March 16th, 2023 for symptoms of right ankle and right foot pain. He reported that symptoms started 3 weeks ago. Patient reports feeling confused. He reports getting lost because he is not familiar with his location. He denies history of seizures. The Patient reports coming to [facility] 9 days ago. He does not have a place to live. The medical records, patient has been drinking heavily. He started drinking at 9years old, and stopped 3 weeks ago. He stopped drinking because he found a good job. He reports headaches, pain in calves and knees. He reports difficulty walking. He reports his body warms up. He goes out to the street, the street turns into a mountain, that turns into a hill. He sees animals. People turn into animals. This happens at night. He started noticing this in [place]. He has been in [place] for 4 years, and this started happening in the last 4 days. He is not able to see them inside the hospital. He also saw them in the ambulance. He has nausea and vomiting. He reports dizziness. He hears someone calling him by his name, and when he turns around there is no none. Neurology consult was placed for evaluation of altered mental status. Laboratory evaluation showed WBC 2.3, Hgb 10.2, HCT 31.4, platelets 60, potassium 3.3, glucose 104, BUN 5, creatinine 0.6, calcium 7.7, magnesium 1.3, total bilirubin 2.0, AST 73, ammonia 56, urine drug screen positive for benzodiazepines, INR 1.6, PT 15.8, D-dimer 11.54. Ankle x-ray showed chronic distal fibular and medial malleoli fractures with soft tissue swelling. CT head without contrast showed no evidence of acute intracranial abnormalities. There is moderate atrophy for age. CT angio chest abdomen and pelvis per report showed no evidence of pulmonary embolism there is a low-density left upper lobe mass interspersed with air suspicious for pulmonary nfarction. Cirrhosis and portal hypertension. Trace ascites. Right lower extremity duplex US showed no evidence of deep venous thrombosis. PMHx:  Liver cirrhosis    Past surgical Hx:  None    Family Hx:  Hernia    Social History     Tobacco Use    Smoking status: Never    Smokeless tobacco: Never   Substance Use Topics    Alcohol use: Not on file      No Known Allergies   Prior to Admission medications    Medication Sig Start Date End Date Taking? Authorizing Provider   doxycycline (VIBRA-TABS) 100 mg tablet Take 1 Tablet by mouth two (2) times a day for 7 days.  3/16/23 3/23/23 Yes Sravan Pressley MD     Review of Systems:    Constitutional:  Patient denies chills, fever, night sweats, or weight loss  Eye:  Patient denies drainage, eye pain, redness, or vision change  ENT:  Patient denies dental pain, ear pain, epistaxis, jaw pain, rhinorrhea, or sore throat  Cardiovascular:  Patient denies chest pain, leg swelling, orthopnea, palpitations, or syncope  Respiratory:  Patient denies cough, dyspnea, dyspnea on exertion, hemoptysis, or wheezing  GI:  Patient denies abdominal pain, constipation, diarrhea, melena,  or rectal pain, Genitourinary:  Patient denies discharge, dysuria, flank pain, hematuria, urinary frequency, or urinary retention  Musculoskeletal:  Patient denies calf tenderness, joint pain, or neck pain  Neurologic:  Patient denies headache, neck stiffness, numbness, slurred speech, weakness, or seizure  Skin:  Patient denies diaphoretic, jaundice, pruritus, or rash  Hematologic and Lymphatic:  Patient denies easy bruising, lymphadenopathy, or prolonged bleeding  Allergic and Immunologic:  Patient denies hives, sneezing, or tongue swelling  Endocrine:  Patient denies excessive thirst, fatigue, or nocturia  Psychiatric:  Patient denies anxiety, depression, hallucination,  or suicidal thoughts  Additional ROS Info: All other organ systems reviewed and are negative except as indicated. Objective:     Patient Vitals for the past 8 hrs:   BP Temp Pulse Resp SpO2   03/17/23 1200 -- -- 79 -- --   03/17/23 0747 -- -- 69 -- --   03/17/23 0737 (!) 115/58 98.9 °F (37.2 °C) 74 21 100 %       Physical Exam:  General: Patient sitting up at bedside, is in no apparent distress. HEENT: Normocephalic, atraumatic. Anicteric sclerae, No obvious abnormalities. NEURO:    MS: Alert and oriented to person, place, and time. No impairment of comprehension, naming, or repetition. CN: Pupils are unequal(right 4 mm, left 1 mm) round and reactive to light bilaterally, visual fields: limited, poor vision in the right eye.  extraocular muscles intact, no nystagmus, facial sensation intact to LT. Face symmetric at rest and with activation. Shoulder shrug is full. Motor: Has full strength in all extremities. No drift. Tone is normal. Has fine tremor in the upper extremities    Sensory: numb in the legs distally to light touch    Coordination: No dysmetria on finger-nose-finger heel-knee-shin and HKS. No tremor. Gait: antalgic    Assessment:     Active Problems:    AMS (altered mental status) (3/16/2023)    53 yo man with history of alcohol abuse, cirrhosis of the liver presents for evaluation of acute encephalopathy in the setting of multiple metabolic disturbances including hypocalcemia, hyperammonemia, and chronic alcohol abuse. Wernicke encephalopathy is a possibility. Plan:   -MRI brain without contrast showed no evidence of acute intracranial abnormality. There was evidence of  symmetric T1 shortening in the bilateral globus pallidus, most consistent with chronic liver disease. Generalized parenchymal volume loss, advanced for age.  Mild chronic microvascular ischemic disease.  -Continue Lactulose  -Continue Thiamine supplementation  -Replete electrolytes as necessary  -Agree with EEG  -Ophthalmology consult  -GI consult for management of liver cirrhosis    I discussed the risks and benefits of a telehealth visit and I obtained the patient's for legally authorized representatives informed verbal consent to conduct this assessment using telehealth tools. All of the patient's or legally authorized representative's questions regarding the telehealth interaction were addressed. I provided my name and disclosed to the patient or legally authorized representative my licensure, certification, or registration. This consultation was remotely performed with the assistance of a trained virtual health liaison working at the originating site. The consultation used real-time licensed and encrypted telehealth equipment which allowed a live video connection between my location and the patient's location. Aspects of the evaluation that could not be adequately evaluated by virtual assessment was shared with both the patient and the consulting provider.     Signed By:  Sharifa Sotelo MD     March 17, 2023

## 2023-03-17 NOTE — PROGRESS NOTES
Patients case reviewed during interdisciplinary team meeting in Med Surg/Tele Unit 2.  Rev.  Kristyn Vance 43, [address]
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
## 2023-03-17 NOTE — PROGRESS NOTES
Communication Note    Patient speaks Mexican as their preferred language for their healthcare communication, please reach out to Language Services for  services at:     Tiffanie Saravia Senior  - Navigator - [phone]    Email: [email or web address]  General phone: [phone] ([phone])    Our interpreters are available for team members working with limited English proficient (LEP) patients remotely, in person as well as phone or video interpreters on the [employer]. For the LATEST Language services updates/ resources please see our Language Services page on:[facility] under the Clinical Resources tab. Please always document the use of  services (name and/or number of ) in your clinical notes.

## 2023-03-18 ENCOUNTER — APPOINTMENT (OUTPATIENT)
Dept: CT IMAGING | Age: 48
End: 2023-03-18
Attending: INTERNAL MEDICINE
Payer: MEDICAID

## 2023-03-18 LAB
ALBUMIN SERPL-MCNC: 2.7 G/DL (ref 3.5–5)
ALBUMIN/GLOB SERPL: 0.5 (ref 1.1–2.2)
ALP SERPL-CCNC: 150 U/L (ref 45–117)
ALT SERPL-CCNC: 40 U/L (ref 12–78)
ANION GAP SERPL CALC-SCNC: 9 MMOL/L (ref 5–15)
AST SERPL-CCNC: 71 U/L (ref 15–37)
BASOPHILS # BLD: 0 K/UL (ref 0–0.1)
BASOPHILS NFR BLD: 1 % (ref 0–1)
BILIRUB SERPL-MCNC: 1.1 MG/DL (ref 0.2–1)
BUN SERPL-MCNC: 8 MG/DL (ref 6–20)
BUN/CREAT SERPL: 13 (ref 12–20)
CALCIUM SERPL-MCNC: 8.1 MG/DL (ref 8.5–10.1)
CHLORIDE SERPL-SCNC: 101 MMOL/L (ref 97–108)
CO2 SERPL-SCNC: 26 MMOL/L (ref 21–32)
CREAT SERPL-MCNC: 0.6 MG/DL (ref 0.7–1.3)
DIFFERENTIAL METHOD BLD: ABNORMAL
EOSINOPHIL # BLD: 0.3 K/UL (ref 0–0.4)
EOSINOPHIL NFR BLD: 12 % (ref 0–7)
ERYTHROCYTE [DISTWIDTH] IN BLOOD BY AUTOMATED COUNT: 23.2 % (ref 11.5–14.5)
GLOBULIN SER CALC-MCNC: 5.6 G/DL (ref 2–4)
GLUCOSE SERPL-MCNC: 103 MG/DL (ref 65–100)
HCT VFR BLD AUTO: 33.6 % (ref 36.6–50.3)
HGB BLD-MCNC: 10.6 G/DL (ref 12.1–17)
IMM GRANULOCYTES # BLD AUTO: 0 K/UL
IMM GRANULOCYTES NFR BLD AUTO: 0 %
LYMPHOCYTES # BLD: 0.9 K/UL (ref 0.8–3.5)
LYMPHOCYTES NFR BLD: 33 % (ref 12–49)
MAGNESIUM SERPL-MCNC: 1.1 MG/DL (ref 1.6–2.4)
MCH RBC QN AUTO: 26.2 PG (ref 26–34)
MCHC RBC AUTO-ENTMCNC: 31.5 G/DL (ref 30–36.5)
MCV RBC AUTO: 83 FL (ref 80–99)
MONOCYTES # BLD: 0.7 K/UL (ref 0–1)
MONOCYTES NFR BLD: 25 % (ref 5–13)
NEUTS SEG # BLD: 0.8 K/UL (ref 1.8–8)
NEUTS SEG NFR BLD: 29 % (ref 32–75)
NRBC # BLD: 0 K/UL (ref 0–0.01)
NRBC BLD-RTO: 0 PER 100 WBC
PHOSPHATE SERPL-MCNC: 2.8 MG/DL (ref 2.6–4.7)
PLATELET # BLD AUTO: 67 K/UL (ref 150–400)
PLATELET COMMENTS,PCOM: ABNORMAL
PMV BLD AUTO: 10.1 FL (ref 8.9–12.9)
POTASSIUM SERPL-SCNC: 3.2 MMOL/L (ref 3.5–5.1)
PROT SERPL-MCNC: 8.3 G/DL (ref 6.4–8.2)
RBC # BLD AUTO: 4.05 M/UL (ref 4.1–5.7)
RBC MORPH BLD: ABNORMAL
SODIUM SERPL-SCNC: 136 MMOL/L (ref 136–145)
WBC # BLD AUTO: 2.7 K/UL (ref 4.1–11.1)

## 2023-03-18 PROCEDURE — 74011000258 HC RX REV CODE- 258: Performed by: INTERNAL MEDICINE

## 2023-03-18 PROCEDURE — 85025 COMPLETE CBC W/AUTO DIFF WBC: CPT

## 2023-03-18 PROCEDURE — 80053 COMPREHEN METABOLIC PANEL: CPT

## 2023-03-18 PROCEDURE — 74011000250 HC RX REV CODE- 250: Performed by: STUDENT IN AN ORGANIZED HEALTH CARE EDUCATION/TRAINING PROGRAM

## 2023-03-18 PROCEDURE — 74011000636 HC RX REV CODE- 636: Performed by: INTERNAL MEDICINE

## 2023-03-18 PROCEDURE — 84100 ASSAY OF PHOSPHORUS: CPT

## 2023-03-18 PROCEDURE — 74011250637 HC RX REV CODE- 250/637: Performed by: HOSPITALIST

## 2023-03-18 PROCEDURE — 74011250637 HC RX REV CODE- 250/637: Performed by: INTERNAL MEDICINE

## 2023-03-18 PROCEDURE — 74011250636 HC RX REV CODE- 250/636: Performed by: HOSPITALIST

## 2023-03-18 PROCEDURE — 65270000032 HC RM SEMIPRIVATE

## 2023-03-18 PROCEDURE — 74011250636 HC RX REV CODE- 250/636: Performed by: STUDENT IN AN ORGANIZED HEALTH CARE EDUCATION/TRAINING PROGRAM

## 2023-03-18 PROCEDURE — 83735 ASSAY OF MAGNESIUM: CPT

## 2023-03-18 PROCEDURE — 36415 COLL VENOUS BLD VENIPUNCTURE: CPT

## 2023-03-18 PROCEDURE — 74011000258 HC RX REV CODE- 258: Performed by: STUDENT IN AN ORGANIZED HEALTH CARE EDUCATION/TRAINING PROGRAM

## 2023-03-18 PROCEDURE — 74011250637 HC RX REV CODE- 250/637: Performed by: STUDENT IN AN ORGANIZED HEALTH CARE EDUCATION/TRAINING PROGRAM

## 2023-03-18 PROCEDURE — 95816 EEG AWAKE AND DROWSY: CPT | Performed by: PSYCHIATRY & NEUROLOGY

## 2023-03-18 PROCEDURE — 71275 CT ANGIOGRAPHY CHEST: CPT

## 2023-03-18 PROCEDURE — 74011250636 HC RX REV CODE- 250/636: Performed by: INTERNAL MEDICINE

## 2023-03-18 RX ORDER — POTASSIUM CHLORIDE 750 MG/1
30 TABLET, FILM COATED, EXTENDED RELEASE ORAL
Status: COMPLETED | OUTPATIENT
Start: 2023-03-18 | End: 2023-03-18

## 2023-03-18 RX ORDER — LANOLIN ALCOHOL/MO/W.PET/CERES
100 CREAM (GRAM) TOPICAL DAILY
Status: DISCONTINUED | OUTPATIENT
Start: 2023-03-19 | End: 2023-03-22 | Stop reason: HOSPADM

## 2023-03-18 RX ORDER — MAGNESIUM SULFATE HEPTAHYDRATE 40 MG/ML
2 INJECTION, SOLUTION INTRAVENOUS ONCE
Status: COMPLETED | OUTPATIENT
Start: 2023-03-18 | End: 2023-03-19

## 2023-03-18 RX ORDER — MAGNESIUM SULFATE 1 G/100ML
1 INJECTION INTRAVENOUS ONCE
Status: COMPLETED | OUTPATIENT
Start: 2023-03-18 | End: 2023-03-18

## 2023-03-18 RX ORDER — DICLOFENAC SODIUM 10 MG/G
4 GEL TOPICAL 4 TIMES DAILY
Status: DISCONTINUED | OUTPATIENT
Start: 2023-03-18 | End: 2023-03-22 | Stop reason: HOSPADM

## 2023-03-18 RX ORDER — MAGNESIUM SULFATE 1 G/100ML
1 INJECTION INTRAVENOUS ONCE
Status: COMPLETED | OUTPATIENT
Start: 2023-03-18 | End: 2023-03-19

## 2023-03-18 RX ORDER — LANOLIN ALCOHOL/MO/W.PET/CERES
100 CREAM (GRAM) TOPICAL DAILY
Status: DISCONTINUED | OUTPATIENT
Start: 2023-03-18 | End: 2023-03-18

## 2023-03-18 RX ADMIN — ACETAMINOPHEN 650 MG: 325 TABLET ORAL at 08:58

## 2023-03-18 RX ADMIN — CEFTRIAXONE SODIUM 1 G: 1 INJECTION, POWDER, FOR SOLUTION INTRAMUSCULAR; INTRAVENOUS at 15:27

## 2023-03-18 RX ADMIN — ACETAMINOPHEN 650 MG: 325 TABLET ORAL at 19:31

## 2023-03-18 RX ADMIN — THIAMINE HYDROCHLORIDE 400 MG: 100 INJECTION, SOLUTION INTRAMUSCULAR; INTRAVENOUS at 05:30

## 2023-03-18 RX ADMIN — DICLOFENAC SODIUM 4 G: 10 GEL TOPICAL at 21:22

## 2023-03-18 RX ADMIN — POTASSIUM CHLORIDE 30 MEQ: 750 TABLET, FILM COATED, EXTENDED RELEASE ORAL at 01:29

## 2023-03-18 RX ADMIN — MAGNESIUM SULFATE HEPTAHYDRATE 1 G: 1 INJECTION, SOLUTION INTRAVENOUS at 01:29

## 2023-03-18 RX ADMIN — SODIUM CHLORIDE, PRESERVATIVE FREE 10 ML: 5 INJECTION INTRAVENOUS at 05:30

## 2023-03-18 RX ADMIN — LACTULOSE 15 ML: 20 SOLUTION ORAL at 08:30

## 2023-03-18 RX ADMIN — DOXYCYCLINE HYCLATE 100 MG: 100 TABLET, COATED ORAL at 08:30

## 2023-03-18 RX ADMIN — DICLOFENAC SODIUM 4 G: 10 GEL TOPICAL at 12:40

## 2023-03-18 RX ADMIN — SODIUM CHLORIDE, PRESERVATIVE FREE 10 ML: 5 INJECTION INTRAVENOUS at 15:27

## 2023-03-18 RX ADMIN — MAGNESIUM SULFATE HEPTAHYDRATE 2 G: 40 INJECTION, SOLUTION INTRAVENOUS at 10:32

## 2023-03-18 RX ADMIN — IOPAMIDOL 100 ML: 755 INJECTION, SOLUTION INTRAVENOUS at 16:54

## 2023-03-18 RX ADMIN — MAGNESIUM SULFATE HEPTAHYDRATE 1 G: 1 INJECTION, SOLUTION INTRAVENOUS at 08:53

## 2023-03-18 RX ADMIN — FOLIC ACID 1 MG: 1 TABLET ORAL at 08:30

## 2023-03-18 RX ADMIN — DICLOFENAC SODIUM 4 G: 10 GEL TOPICAL at 17:34

## 2023-03-18 RX ADMIN — SODIUM CHLORIDE, PRESERVATIVE FREE 10 ML: 5 INJECTION INTRAVENOUS at 21:24

## 2023-03-18 RX ADMIN — DOXYCYCLINE HYCLATE 100 MG: 100 TABLET, COATED ORAL at 21:22
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
## 2023-03-18 NOTE — PROGRESS NOTES
Bedside and Verbal shift change report given to Angela Barros (oncoming nurse) by Mauricio Bajwa (offgoing nurse).  Report included the following information SBAR, Kardex, Intake/Output, MAR, Accordion, Recent Results, and Cardiac Rhythm SR .

## 2023-03-18 NOTE — PROCEDURES
EEG REPORT    Patient Name: Terence Agustin  : 1975  Age: 52 y.o. Ordering physician: Dr. Joaquin Byrne  Date of EEG: 3/17/2023   17:42 - 18:03  Diagnosis: encephalopathy  Interpreting physician: Cliff Herman D.O. FAAN    Procedure: EEG    CLINICAL INDICATION: The patient is a 52 y.o. male who is being evaluated for baseline electro cerebral activities and to rule out seizure focus. Current Facility-Administered Medications   Medication Dose Route Frequency    sodium chloride (NS) flush 5-40 mL  5-40 mL IntraVENous Q8H    sodium chloride (NS) flush 5-40 mL  5-40 mL IntraVENous PRN    acetaminophen (TYLENOL) tablet 650 mg  650 mg Oral Q6H PRN    Or    acetaminophen (TYLENOL) suppository 650 mg  650 mg Rectal Q6H PRN    polyethylene glycol (MIRALAX) packet 17 g  17 g Oral DAILY PRN    ondansetron (ZOFRAN ODT) tablet 4 mg  4 mg Oral Q8H PRN    Or    ondansetron (ZOFRAN) injection 4 mg  4 mg IntraVENous Q6H PRN    [Held by provider] enoxaparin (LOVENOX) injection 40 mg  40 mg SubCUTAneous DAILY    lactulose (CHRONULAC) 10 gram/15 mL solution 15 mL  15 mL Oral BID    LORazepam (ATIVAN) injection 1 mg  1 mg IntraVENous Q4H PRN    [Held by provider] LORazepam (ATIVAN) injection 2 mg  2 mg IntraVENous W3P PRN    folic acid (FOLVITE) tablet 1 mg  1 mg Oral DAILY    cefTRIAXone (ROCEPHIN) 1 g in sterile water (preservative free) 10 mL IV syringe  1 g IntraVENous Q24H    doxycycline (VIBRA-TABS) tablet 100 mg  100 mg Oral Q12H    [Held by provider] lactated Ringers infusion  100 mL/hr IntraVENous CONTINUOUS    morphine injection 2 mg  2 mg IntraVENous Q4H PRN           DESCRIPTION OF PROCEDURE:     This is a digitally recorded electroencephalogram  Electrodes were applied in accordance with the international 10-20 system of electrode placement.     18 channels of scalp EEG are recorded  A channel was used for EoG  Another channel was used for ECG   The data is stored digitally and reviewed in reformatted montages for optimal display  EEG  was reviewed in both bipolar and referential montages    Description of Activity: The background of this recording contains no well-formed alpha activity seen. There was a mixture of diffuse slightly disorganized low amplitude theta and delta activity identified throughout the study. There was a small amount of beta activity seen. Throughout the recording, there were no clear areas of focal slowing nor spike or spike-and-wave discharges seen. Hyperventilation was not performed. Photic stimulation produced no response in the posterior head regions. During the recording, the patient did not achieve stage II sleep      Clinical Interpretation: This EEG is abnormal. There low amplitude mild generalized slowing as seen in encephalopathies. There is no focal asymmetry, seizures or epileptiform discharges seen. Clinical correlation is recommended             MEET Sorto

## 2023-03-18 NOTE — PROGRESS NOTES
Problem: Falls - Risk of  Goal: *Absence of Falls  Description: Document Jackelyn Solorio Fall Risk and appropriate interventions in the flowsheet. Outcome: Progressing Towards Goal  Note: Fall Risk Interventions:     Problem: Alcohol Withdrawal  Goal: Interventions  Outcome: Progressing Towards Goal    Problem: Pressure Injury - Risk of  Goal: *Prevention of pressure injury  Description: Document Kaleb Scale and appropriate interventions in the flowsheet.   Outcome: Progressing Towards Goal  Note: Pressure Injury Interventions:  Sensory Interventions: Assess changes in LOC, Keep linens dry and wrinkle-free    Moisture Interventions: Absorbent underpads    Activity Interventions: Increase time out of bed    Mobility Interventions: HOB 30 degrees or less, PT/OT evaluation    Nutrition Interventions: Document food/fluid/supplement intake    Friction and Shear Interventions: Minimize layers      Problem: General Medical Care Plan  Goal: *Vital signs within specified parameters  Outcome: Progressing Towards Goal  Goal: *Labs within defined limits  Outcome: Progressing Towards Goal  Goal: *Absence of infection signs and symptoms  Outcome: Progressing Towards Goal  Goal: *Skin integrity maintained  Outcome: Progressing Towards Goal  Goal: *Fluid volume balance  Outcome: Progressing Towards Goal  Goal: *Optimize nutritional status  Outcome: Progressing Towards Goal  Goal: *Progressive mobility and function (eg: ADL's)  Outcome: Progressing Towards Goal

## 2023-03-18 NOTE — PROGRESS NOTES
[facility] Adult  Hospitalist Group                                                                                          Hospitalist Progress Note  Roxann Lalnos MD  Answering service: [phone] from in house phone        Date of Service:  3/18/2023  NAME:  Jess Lozano  :  1975  MRN:  [id number]   Room: [facility]    Admission Summary:   Per HPI: Jess Lozano is a 52 y.o. male who presents with right ankle and right foot pain for several days ago. Patient is a Citizen of [place]speaking patient had a good historian most of the history was provided by Citizen [place]speaking RN at bedside and translating services. Patient is complaining of pain in right foot and right leg. Denied any nausea vomiting abdominal pain. Patient is only AAO x1 cannot tell time date or where he is going on to tell his name PT patient states that he for past 1 year has been drinking really heavily but has not had any drink for past 1 month. Endorsed that he was [facility] but he got endoscopy and foot repair. Patient denies taking any medication at home. Denies tobacco use any decrease in drug use       Interval history / Subjective: Follow up the patient admitted for treatment of confusion, cellulitis of right leg  NAD, acute events over night noted  Low grade fever T 100  Pain RLE improved  The patient overall is feeling better  Alert and oriented to name, knows he is in hospital, aware about month  Medical record from [address] [facility] [address] requested but still pending. Neurology input appreciated  MRI brain was reviewed and negative for CVA  EEG reviewed and negative for seizure  CIWA 5    He is from [facility] and was in [place] for 2 year on work related visa. 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     The patient denies having seizure and had last alcohol 3 weeks ago  The patient admits having right ankle fracture for 2 years, he had cast or splint for 1 year and it was removed 1 year ago. He is able to ambulate and put weight to his right foot. Condition of the patient and plan of care was discussed with RN, CM, pharmacy during morning rounds. CT chest was reviewed, due to poor study, will request another CTA chest.    Telemetry was reviewed independently: NSR  BM x3     Assessment & Plan: Altered mental status, improved  History of heavy alcohol use  -CT head negative for acute process. UDS positive benzodiazepine  -Ammonia 56  -Possible related to hepatic encephalopathy versus Wernicke's Korsakoff syndrome due to alcohol use versus metabolic encephalopathy due to alcohol use     -Lactulose twice daily  -MRI brain pending  -[facility] protocol  -thiamine Wernicke dose, completed IV, will change PO  -Symptoms were triggered by Ativan  -Folic acid  -F34, folate and TSH: WNL, RPR non reactive  - neurology consulted, recommendations were reviewed  EEG result and neurology input reviewed, negative for seizure. Elevated D-dimer  Suspected pulmonary infarct  ? Left upper lobe solid lesion  -CTA with no large pulmonary embolism. Left upper lobe mass interspersed with air suggestive of pulmonary infarct. Suboptimal contrast  -Duplex no acute DVT  -Patient denies hemoptysis cough or chest pain     -Repeat CTA with optimal contrast rule out PE contributing to pulmonary infarct  -Quant TB given recent immigration pending  -Continue Ceftriaxone and doxycycline for possible bacterial pneumonia          Compensated cirrhosis  Portal hypertension  Coagulopathy  -CT abdomen with portal hypertension and cirrhosis. Trace ascites  -Unknown etiology?   Alcohol     -hepatitis panel negative  -Ultrasound liver reviewed  -will need outpatient follow-up with hepatology     Right ankle swelling and redness suspected cellulitis, resolved  -X-ray with chronic distal fibular and medial malleoli fracture and soft tissue swelling  -Continue antibiotic CTX and doxy   -PT OT   US RLE reviewed: no DVT    Right ankle Fx chronic  Per pt right ankle fracture for 2 years, he had cast or splint for 1 year and it was removed 1 year ago. He is able to ambulate and put weight to his right foot. XR reviewed  The patient will need follow up ortho as outpt   Will need PT/OT evaluation       Thrombocytopenia due to liver cirrhosis  -Monitor platelets    Hypomagnesemia, sever  Will replace Magnesium IV    Hypokalemia  Replacement IV and PO       Code status: Full code  Prophylaxis: SCD, hold Lovenox for now due to low platelets  Care Plan discussed with: patient, RN, CM  Anticipated Disposition: in 24-48 hrs, pending CTA chest, continue CIWA  Inpatient  Cardiac monitoring: Telemetry     Hospital Problems  Never Reviewed            Codes Class Noted POA    AMS (altered mental status) ICD-10-CM: R41.82  ICD-9-CM: 780.97  3/16/2023 Unknown         Social Determinants of Health     Tobacco Use: Low Risk     Smoking Tobacco Use: Never    Smokeless Tobacco Use: Never    Passive Exposure: Not on file   Alcohol Use: Not on file   Financial Resource Strain: Not on file   Food Insecurity: Not on file   Transportation Needs: Not on file   Physical Activity: Not on file   Stress: Not on file   Social Connections: Not on file   Intimate Partner Violence: Not on file   Depression: Not on file   Housing Stability: Not on file       Review of Systems:   A comprehensive review of systems was negative except for that written in the HPI. Vital Signs:    Last 24hrs VS reviewed since prior progress note.  Most recent are:  Visit Vitals  /68   Pulse 89   Temp 98.7 °F (37.1 °C)   Resp 20   Ht 5' 1.81\" (1.57 m)   Wt 63 kg (138 lb 14.2 oz)   SpO2 100%   BMI 25.56 kg/m²         Intake/Output Summary (Last 24 hours) at 3/18/2023 1148  Last data filed at 3/18/2023 1032  Gross per 24 hour   Intake 1945 ml   Output 1550 ml   Net 395 ml          Physical Examination:     I had a face to face encounter with this patient and independently examined them on 3/18/2023 as outlined below:          General : alert x 2, awake, no acute distress, slow, apropriate  HEENT: PEERL, EOMI, moist mucus membrane, TM clear  Neck: supple, no JVD, no meningeal signs  Chest: Coarse to auscultation bilaterally   CVS: S1 S2 heard, Capillary refill less than 2 seconds  Abd: soft/ non tender, non distended, BS physiological,   Ext: no clubbing, no cyanosis, + improved edema RLE, brisk 2+ DP pulses  Neuro/Psych: pleasant mood and affect, CN 2-12 grossly intact, sensory grossly within normal limit, Strength 5/5 in all extremities, DTR 1+ x 4  Skin: warm     Data Review:    Review and/or order of clinical lab test    US liver:  IMPRESSION     1. Cirrhotic liver morphology. No focal hepatic mass is seen. 2. Gallbladder wall thickening which may be related to the under distention and  chronic liver disease. 3. Normal sonographic appearance of the RIGHT kidney. US RLE: No evidence of deep vein thrombosis in the right lower extremity    MRI brain:  IMPRESSION   1. Evaluation is limited by motion. No acute intracranial abnormality. 2. Generalized parenchymal volume loss, advanced for age. Mild chronic  microvascular ischemic disease. 3. Symmetric T1 shortening in the bilateral globus pallidus, most consistent  with chronic liver disease. I have personally and independently reviewed all pertinent labs, diagnostic studies, imaging, and have provided independent interpretation of the same.      Labs:     Recent Labs     03/18/23  0001 03/17/23  0339   WBC 2.7* 2.3*   HGB 10.6* 10.2*   HCT 33.6* 31.4*   PLT 67* 60*       Recent Labs     03/18/23  0001 03/17/23  0339 03/16/23  0323    136 135*   K 3.2* 3.3* 3.4*    102 99   CO2 26 26 26   BUN 8 5* 12   CREA 0.60* 0.62* 0.75   * 104* 95   CA 8.1* 7.7* 7.9*   MG 1.1* 1.3* 1.0*   PHOS 2.8  --   --        Recent Labs 03/18/23  0001 03/17/23  0339 03/16/23  0323   ALT 40 38 50   * 86 112   TBILI 1.1* 2.0* 1.9*   TP 8.3* 7.6 8.5*   ALB 2.7* 2.5* 2.9*   GLOB 5.6* 5.1* 5.6*       Recent Labs     03/16/23  1850   INR 1.6*   PTP 15.8*        No results for input(s): FE, TIBC, PSAT, FERR in the last 72 hours. Lab Results   Component Value Date/Time    Folate 12.4 03/16/2023 06:50 PM        No results for input(s): PH, PCO2, PO2 in the last 72 hours. No results for input(s): CPK, CKNDX, TROIQ in the last 72 hours. No lab exists for component: CPKMB  No results found for: CHOL, CHOLX, CHLST, CHOLV, HDL, HDLP, LDL, LDLC, DLDLP, TGLX, TRIGL, TRIGP, CHHD, CHHDX  No results found for: [facility]  Lab Results   Component Value Date/Time    Color DARK YELLOW 03/16/2023 06:47 AM    Appearance CLEAR 03/16/2023 06:47 AM    Specific gravity 1.025 03/16/2023 06:47 AM    pH (UA) 6.5 03/16/2023 06:47 AM    Protein Negative 03/16/2023 06:47 AM    Glucose Negative 03/16/2023 06:47 AM    Ketone TRACE (A) 03/16/2023 06:47 AM    Urobilinogen >8.0 (H) 03/16/2023 06:47 AM    Nitrites Negative 03/16/2023 06:47 AM    Leukocyte Esterase TRACE (A) 03/16/2023 06:47 AM    Epithelial cells FEW 03/16/2023 06:47 AM    Bacteria Negative 03/16/2023 06:47 AM    WBC 0-4 03/16/2023 06:47 AM    RBC 0-5 03/16/2023 06:47 AM       Notes reviewed from all clinical/nonclinical/nursing services involved in patient's clinical care. Care coordination discussions were held with appropriate clinical/nonclinical/ nursing providers based on care coordination needs. Patients current active Medications were reviewed, considered, added and adjusted based on the clinical condition today. Home Medications were reconciled to the best of my ability given all available resources at the time of admission. Route is PO if not otherwise noted.       Admission Status: inpatient      Medications Reviewed:     Current Facility-Administered Medications   Medication Dose Route Frequency    diclofenac (VOLTAREN) 1 % topical gel 4 g  4 g Topical QID    cefTRIAXone (ROCEPHIN) 1 g in 0.9% sodium chloride (MBP/ADV) 50 mL MBP  1 g IntraVENous Q24H    sodium chloride (NS) flush 5-40 mL  5-40 mL IntraVENous Q8H    sodium chloride (NS) flush 5-40 mL  5-40 mL IntraVENous PRN    acetaminophen (TYLENOL) tablet 650 mg  650 mg Oral Q6H PRN    Or    acetaminophen (TYLENOL) suppository 650 mg  650 mg Rectal Q6H PRN    polyethylene glycol (MIRALAX) packet 17 g  17 g Oral DAILY PRN    ondansetron (ZOFRAN ODT) tablet 4 mg  4 mg Oral Q8H PRN    Or    ondansetron (ZOFRAN) injection 4 mg  4 mg IntraVENous Q6H PRN    [Held by provider] enoxaparin (LOVENOX) injection 40 mg  40 mg SubCUTAneous DAILY    lactulose (CHRONULAC) 10 gram/15 mL solution 15 mL  15 mL Oral BID    LORazepam (ATIVAN) injection 1 mg  1 mg IntraVENous Q4H PRN    [Held by provider] LORazepam (ATIVAN) injection 2 mg  2 mg IntraVENous S8X PRN    folic acid (FOLVITE) tablet 1 mg  1 mg Oral DAILY    doxycycline (VIBRA-TABS) tablet 100 mg  100 mg Oral Q12H    morphine injection 2 mg  2 mg IntraVENous Q4H PRN     ______________________________________________________________________  EXPECTED LENGTH OF STAY: 2d 12h  ACTUAL LENGTH OF STAY:          2                 Eliazar Pardo MD

## 2023-03-18 NOTE — PROGRESS NOTES
0710) Bedside shift change report given to Clau Hernadez, RN (oncoming nurse) by Alaina Joaquin, RN (offgoing nurse). Report included the following information SBAR, Kardex, MAR, and Cardiac Rhythm NSR . Pt steady walking to bathroom, Ax0x4.   0841) Yarielsernelda Feliciano 5. Also he is having ankle pain 6/10-- could he have something for moderate pain? 1020) IDR with Dr. Suzie Gr (MD), Rikki Clark (nursing director) and Clau Hernadez (RN) to discuss plan of care including downgrade to telemetry, Voltaren for ankle pain, CTA chest, neurology consult done yesterday. [id number] used for release of information for [facility] and for pain reassessment. [phone] for discussion for IS  1539) Perfectsernelda Shirley is helping for pain. Should we hold the lactulose this evening? Pt reports about 10 bowel movements. Also plan for CT at 4:30 pm  (I forgot they had to be NPO for 4 hours!)  1920) Bedside shift change report given to Alaina Joaquin, RN (oncoming nurse) by Clau Hernadez RN (offgoing nurse). Report included the following information SBAR, Kardex, and MAR.

## 2023-03-19 LAB
ALBUMIN SERPL-MCNC: 2.6 G/DL (ref 3.5–5)
ALBUMIN/GLOB SERPL: 0.5 (ref 1.1–2.2)
ALP SERPL-CCNC: 154 U/L (ref 45–117)
ALT SERPL-CCNC: 36 U/L (ref 12–78)
AMMONIA PLAS-SCNC: 33 UMOL/L
ANION GAP SERPL CALC-SCNC: 9 MMOL/L (ref 5–15)
AST SERPL-CCNC: 58 U/L (ref 15–37)
BASOPHILS # BLD: 0.1 K/UL (ref 0–0.1)
BASOPHILS NFR BLD: 2 % (ref 0–1)
BILIRUB SERPL-MCNC: 0.9 MG/DL (ref 0.2–1)
BUN SERPL-MCNC: 10 MG/DL (ref 6–20)
BUN/CREAT SERPL: 17 (ref 12–20)
CALCIUM SERPL-MCNC: 8.5 MG/DL (ref 8.5–10.1)
CHLORIDE SERPL-SCNC: 103 MMOL/L (ref 97–108)
CO2 SERPL-SCNC: 25 MMOL/L (ref 21–32)
CREAT SERPL-MCNC: 0.6 MG/DL (ref 0.7–1.3)
DIFFERENTIAL METHOD BLD: ABNORMAL
EOSINOPHIL # BLD: 0.5 K/UL (ref 0–0.4)
EOSINOPHIL NFR BLD: 15 % (ref 0–7)
ERYTHROCYTE [DISTWIDTH] IN BLOOD BY AUTOMATED COUNT: 22.6 % (ref 11.5–14.5)
GLOBULIN SER CALC-MCNC: 5.6 G/DL (ref 2–4)
GLUCOSE SERPL-MCNC: 116 MG/DL (ref 65–100)
HCT VFR BLD AUTO: 34.5 % (ref 36.6–50.3)
HGB BLD-MCNC: 10.9 G/DL (ref 12.1–17)
IMM GRANULOCYTES # BLD AUTO: 0 K/UL
IMM GRANULOCYTES NFR BLD AUTO: 0 %
INR PPP: 1.5 (ref 0.9–1.1)
LYMPHOCYTES # BLD: 1.1 K/UL (ref 0.8–3.5)
LYMPHOCYTES NFR BLD: 35 % (ref 12–49)
MAGNESIUM SERPL-MCNC: 1.1 MG/DL (ref 1.6–2.4)
MAGNESIUM SERPL-MCNC: 1.7 MG/DL (ref 1.6–2.4)
MCH RBC QN AUTO: 27.3 PG (ref 26–34)
MCHC RBC AUTO-ENTMCNC: 31.6 G/DL (ref 30–36.5)
MCV RBC AUTO: 86.3 FL (ref 80–99)
MONOCYTES # BLD: 0.6 K/UL (ref 0–1)
MONOCYTES NFR BLD: 20 % (ref 5–13)
NEUTS SEG # BLD: 0.9 K/UL (ref 1.8–8)
NEUTS SEG NFR BLD: 28 % (ref 32–75)
NRBC # BLD: 0 K/UL (ref 0–0.01)
NRBC BLD-RTO: 0 PER 100 WBC
PLATELET # BLD AUTO: 77 K/UL (ref 150–400)
PLATELET COMMENTS,PCOM: ABNORMAL
PMV BLD AUTO: 10.6 FL (ref 8.9–12.9)
POTASSIUM SERPL-SCNC: 3.3 MMOL/L (ref 3.5–5.1)
PROT SERPL-MCNC: 8.2 G/DL (ref 6.4–8.2)
PROTHROMBIN TIME: 15.1 SEC (ref 9–11.1)
RBC # BLD AUTO: 4 M/UL (ref 4.1–5.7)
RBC MORPH BLD: ABNORMAL
SODIUM SERPL-SCNC: 137 MMOL/L (ref 136–145)
WBC # BLD AUTO: 3.2 K/UL (ref 4.1–11.1)

## 2023-03-19 PROCEDURE — 74011000258 HC RX REV CODE- 258: Performed by: INTERNAL MEDICINE

## 2023-03-19 PROCEDURE — 74011250636 HC RX REV CODE- 250/636: Performed by: INTERNAL MEDICINE

## 2023-03-19 PROCEDURE — 82140 ASSAY OF AMMONIA: CPT

## 2023-03-19 PROCEDURE — 65270000032 HC RM SEMIPRIVATE

## 2023-03-19 PROCEDURE — 74011250636 HC RX REV CODE- 250/636

## 2023-03-19 PROCEDURE — 74011250637 HC RX REV CODE- 250/637

## 2023-03-19 PROCEDURE — 80053 COMPREHEN METABOLIC PANEL: CPT

## 2023-03-19 PROCEDURE — 74011250637 HC RX REV CODE- 250/637: Performed by: INTERNAL MEDICINE

## 2023-03-19 PROCEDURE — 83735 ASSAY OF MAGNESIUM: CPT

## 2023-03-19 PROCEDURE — 74011000250 HC RX REV CODE- 250: Performed by: STUDENT IN AN ORGANIZED HEALTH CARE EDUCATION/TRAINING PROGRAM

## 2023-03-19 PROCEDURE — 85025 COMPLETE CBC W/AUTO DIFF WBC: CPT

## 2023-03-19 PROCEDURE — 36415 COLL VENOUS BLD VENIPUNCTURE: CPT

## 2023-03-19 PROCEDURE — 85610 PROTHROMBIN TIME: CPT

## 2023-03-19 PROCEDURE — 74011250637 HC RX REV CODE- 250/637: Performed by: STUDENT IN AN ORGANIZED HEALTH CARE EDUCATION/TRAINING PROGRAM

## 2023-03-19 RX ORDER — POTASSIUM CHLORIDE 750 MG/1
10 TABLET, FILM COATED, EXTENDED RELEASE ORAL ONCE
Status: COMPLETED | OUTPATIENT
Start: 2023-03-19 | End: 2023-03-19

## 2023-03-19 RX ORDER — MAGNESIUM SULFATE HEPTAHYDRATE 40 MG/ML
2 INJECTION, SOLUTION INTRAVENOUS ONCE
Status: COMPLETED | OUTPATIENT
Start: 2023-03-19 | End: 2023-03-19

## 2023-03-19 RX ORDER — LANOLIN ALCOHOL/MO/W.PET/CERES
400 CREAM (GRAM) TOPICAL DAILY
Status: DISCONTINUED | OUTPATIENT
Start: 2023-03-19 | End: 2023-03-20

## 2023-03-19 RX ORDER — LANOLIN ALCOHOL/MO/W.PET/CERES
400 CREAM (GRAM) TOPICAL ONCE
Status: COMPLETED | OUTPATIENT
Start: 2023-03-19 | End: 2023-03-19

## 2023-03-19 RX ADMIN — DICLOFENAC SODIUM 4 G: 10 GEL TOPICAL at 21:11

## 2023-03-19 RX ADMIN — DOXYCYCLINE HYCLATE 100 MG: 100 TABLET, COATED ORAL at 20:35

## 2023-03-19 RX ADMIN — MAGNESIUM SULFATE HEPTAHYDRATE 2 G: 40 INJECTION, SOLUTION INTRAVENOUS at 02:27

## 2023-03-19 RX ADMIN — DICLOFENAC SODIUM 4 G: 10 GEL TOPICAL at 08:20

## 2023-03-19 RX ADMIN — FOLIC ACID 1 MG: 1 TABLET ORAL at 08:19

## 2023-03-19 RX ADMIN — SODIUM CHLORIDE, PRESERVATIVE FREE 10 ML: 5 INJECTION INTRAVENOUS at 21:11

## 2023-03-19 RX ADMIN — Medication 100 MG: at 08:19

## 2023-03-19 RX ADMIN — MAGNESIUM OXIDE 400 MG (241.3 MG MAGNESIUM) TABLET 400 MG: TABLET at 08:19

## 2023-03-19 RX ADMIN — CEFTRIAXONE SODIUM 1 G: 1 INJECTION, POWDER, FOR SOLUTION INTRAMUSCULAR; INTRAVENOUS at 16:20

## 2023-03-19 RX ADMIN — SODIUM CHLORIDE, PRESERVATIVE FREE 10 ML: 5 INJECTION INTRAVENOUS at 06:05

## 2023-03-19 RX ADMIN — DICLOFENAC SODIUM 4 G: 10 GEL TOPICAL at 16:20

## 2023-03-19 RX ADMIN — DOXYCYCLINE HYCLATE 100 MG: 100 TABLET, COATED ORAL at 08:19

## 2023-03-19 RX ADMIN — POTASSIUM BICARBONATE 10 MEQ: 391 TABLET, EFFERVESCENT ORAL at 08:19

## 2023-03-19 RX ADMIN — POTASSIUM CHLORIDE 10 MEQ: 750 TABLET, FILM COATED, EXTENDED RELEASE ORAL at 06:54

## 2023-03-19 RX ADMIN — LACTULOSE 15 ML: 20 SOLUTION ORAL at 08:19

## 2023-03-19 RX ADMIN — ACETAMINOPHEN 650 MG: 325 TABLET ORAL at 20:35

## 2023-03-19 RX ADMIN — MAGNESIUM OXIDE 400 MG (241.3 MG MAGNESIUM) TABLET 400 MG: TABLET at 02:33

## 2023-03-19 RX ADMIN — SODIUM CHLORIDE, PRESERVATIVE FREE 10 ML: 5 INJECTION INTRAVENOUS at 14:35

## 2023-03-19 NOTE — PROGRESS NOTES
[facility]  Hospitalist Group                                                                                          Hospitalist Progress Note  Merissa Rodriguez MD  Answering service: [phone] from in house phone        Date of Service:  3/19/2023  NAME:  Neida Patel  :  1975  MRN:  [id number]   Room: [facility]    Admission Summary:   Per HPI: Neida Patel is a 52 y.o. male who presents with right ankle and right foot pain for several days ago. Patient is a Bengali-speaking patient had a good historian most of the history was provided by Bengali-speaking RN at bedside and translating services. Patient is complaining of pain in right foot and right leg. Denied any nausea vomiting abdominal pain. Patient is only AAO x1 cannot tell time date or where he is going on to tell his name PT patient states that he for past 1 year has been drinking really heavily but has not had any drink for past 1 month. Endorsed that he was in [facility] but he got endoscopy and foot repair. Patient denies taking any medication at home. Denies tobacco use any decrease in drug use       Interval history / Subjective: Follow up the patient admitted for treatment of confusion, cellulitis of right leg, CHRIS Pneumonia  NAD, acute events over night noted  Low electrolytes, Magnesium 1.1 and was replaced over night IV  No fever  Pain RLE improved, aggravated by activity  The patient overall is feeling better, alert and appropriate, speech fluent  Alert and oriented to name, knows he is in hospital, aware about month and year  Medical record from [address] requested but still pending.    Neurology input appreciated  MRI brain was reviewed and negative for CVA  EEG reviewed and negative for seizure  No tremor, no hallucination,   Did not require ativan  Telemetry was reviewed independently: NSR  RN notes were reviewed  The patient had multiple BM, lactulose was on hold yesterday  CTA chest was repeated, reviewed independently, no PE per radiologist, + CHRIS Pneumonia  Condition of the patient and plan of care was discussed with RN during morning rounds    He is [facility] and was in [place] for 2 year on work related visa. 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 The patient denies having seizure and had last alcohol 3 weeks ago  The patient admits having right ankle fracture for 2 years, he had cast or splint for 1 year and it was removed 1 year ago. He is able to ambulate and put weight to his right foot. Assessment & Plan: Altered mental status, resolved  Acute encephalopathy possible toxic and metabolic related to alcohol and acute illness, resolved  History of heavy alcohol use  -CT head negative for acute process. UDS positive benzodiazepine  -Ammonia 56, down to 33  -Possible related to hepatic encephalopathy versus Wernicke's Korsakoff syndrome due to alcohol use versus metabolic encephalopathy due to alcohol use     -Lactulose twice daily  -MRI brain negative for acute changes  -[facility] protocol  -thiamine Wernicke dose, completed IV, will change PO  -Symptoms were triggered by Ativan  -Folic acid  -U53, folate and TSH: WNL, RPR non reactive  - neurology consulted, recommendations were reviewed  EEG result and neurology input reviewed, negative for seizure. Elevated D-dimer  Suspected pulmonary infarct  ? Left upper lobe solid lesion  -CTA with no large pulmonary embolism. Left upper lobe mass interspersed with air suggestive of pulmonary infarct.   Suboptimal contrast  -Duplex no acute DVT  -Patient denies hemoptysis cough or chest pain     -Repeat CTA reviewed: no PE, + CHRIS PNA  -Quant TB given recent immigration pending  -Continue Ceftriaxone and doxycycline for possible bacterial pneumonia          Compensated cirrhosis  Portal hypertension  Coagulopathy  -CT abdomen with portal hypertension and cirrhosis. Trace ascites  -Unknown etiology? Alcohol     -hepatitis panel negative  -Ultrasound liver reviewed  -will need outpatient follow-up with hepatology     Right ankle swelling and redness suspected cellulitis, resolved  -X-ray with chronic distal fibular and medial malleoli fracture and soft tissue swelling  -Continue antibiotic CTX and doxy   -PT OT   US RLE reviewed: no DVT    CHRIS Pneumonia, bacterial  Continue abx: CTX and doxy  Dose not require supplemental O2    Right ankle Fx chronic  Per pt right ankle fracture for 2 years, he had cast or splint for 1 year and it was removed 1 year ago. He is able to ambulate and put weight to his right foot.    XR reviewed  The patient will need follow up ortho as outpt   Follow up PT/OT recommendations  Discussed with ortho on call, reviewed Xrays: no procedures was recommended, dose not need boot, OK WBAT       Thrombocytopenia due to liver cirrhosis, stable  -Monitor platelets    Hypomagnesemia, sever  replace Magnesium IV  Will recheck and monitor    Hypokalemia  Replacement IV and PO       Code status: Full code  Prophylaxis: SCD, hold Lovenox for now due to low platelets  Care Plan discussed with: patient, RN, CM  Anticipated Disposition: in 24-48 hrs, pending PT, clinical improvement,   Inpatient  Cardiac monitoring: Telemetry     Hospital Problems  Never Reviewed            Codes Class Noted POA    AMS (altered mental status) ICD-10-CM: R41.82  ICD-9-CM: 780.97  3/16/2023 Unknown         Social Determinants of Health     Tobacco Use: Low Risk     Smoking Tobacco Use: Never    Smokeless Tobacco Use: Never    Passive Exposure: Not on file   Alcohol Use: Not on file   Financial Resource Strain: Not on file   Food Insecurity: Not on file   Transportation Needs: Not on file   Physical Activity: Not on file   Stress: Not on file   Social Connections: Not on file   Intimate Partner Violence: Not on file   Depression: Not on file   Housing Stability: Not on file       Review of Systems:   A comprehensive review of systems was negative except for that written in the HPI. Vital Signs:    Last 24hrs VS reviewed since prior progress note. Most recent are:  Visit Vitals  /71   Pulse 63   Temp 98.4 °F (36.9 °C)   Resp 21   Ht 5' 1.81\" (1.57 m)   Wt 64.4 kg (142 lb)   SpO2 98%   BMI 26.13 kg/m²         Intake/Output Summary (Last 24 hours) at 3/19/2023 1012  Last data filed at 3/19/2023 6979  Gross per 24 hour   Intake 2300 ml   Output --   Net 2300 ml          Physical Examination:     I had a face to face encounter with this patient and independently examined them on 3/19/2023 as outlined below:          General : alert x name, place, month and year, awake, no acute distress, apropriate  HEENT: PEERL, EOMI, moist mucus membrane, TM clear  Neck: supple, no JVD, no meningeal signs  Chest: Coarse to auscultation bilaterally   CVS: S1 S2 heard, Capillary refill less than 2 seconds  Abd: soft/ non tender, non distended, BS physiological,   Ext: no clubbing, no cyanosis, + improved edema RLE, brisk 2+ DP pulses  Neuro/Psych: pleasant mood and affect, CN 2-12 grossly intact, sensory grossly within normal limit, Strength 5/5 in all extremities, DTR 1+ x 4  Skin: warm     Data Review:    Review and/or order of clinical lab test    US liver:  IMPRESSION     1. Cirrhotic liver morphology. No focal hepatic mass is seen. 2. Gallbladder wall thickening which may be related to the under distention and  chronic liver disease. 3. Normal sonographic appearance of the RIGHT kidney. US RLE: No evidence of deep vein thrombosis in the right lower extremity    MRI brain:  IMPRESSION   1. Evaluation is limited by motion. No acute intracranial abnormality. 2. Generalized parenchymal volume loss, advanced for age. Mild chronic  microvascular ischemic disease.   3. Symmetric T1 shortening in the bilateral globus pallidus, most consistent  with chronic liver disease. Repeat CTA chest 03/18/2023:  IMPRESSION  1. Patchy airspace opacities in the left upper lobe which is which may  represent infection. 2.  Limited study for evaluation of pulmonary embolus. No large pulmonary  embolus is identified. 3.  Other incidental findings as above       I have personally and independently reviewed all pertinent labs, diagnostic studies, imaging, and have provided independent interpretation of the same. Labs:     Recent Labs     03/19/23  0009 03/18/23  0001   WBC 3.2* 2.7*   HGB 10.9* 10.6*   HCT 34.5* 33.6*   PLT 77* 67*       Recent Labs     03/19/23  0551 03/19/23  0009 03/18/23  0001 03/17/23  0339   NA  --  137 136 136   K  --  3.3* 3.2* 3.3*   CL  --  103 101 102   CO2  --  25 26 26   BUN  --  10 8 5*   CREA  --  0.60* 0.60* 0.62*   GLU  --  116* 103* 104*   CA  --  8.5 8.1* 7.7*   MG 1.7 1.1* 1.1* 1.3*   PHOS  --   --  2.8  --        Recent Labs     03/19/23  0009 03/18/23  0001 03/17/23  0339   ALT 36 40 38   * 150* 86   TBILI 0.9 1.1* 2.0*   TP 8.2 8.3* 7.6   ALB 2.6* 2.7* 2.5*   GLOB 5.6* 5.6* 5.1*       Recent Labs     03/19/23  0009 03/16/23  1850   INR 1.5* 1.6*   PTP 15.1* 15.8*        No results for input(s): FE, TIBC, PSAT, FERR in the last 72 hours. Lab Results   Component Value Date/Time    Folate 12.4 03/16/2023 06:50 PM        No results for input(s): PH, PCO2, PO2 in the last 72 hours. No results for input(s): CPK, CKNDX, TROIQ in the last 72 hours.     No lab exists for component: CPKMB  No results found for: CHOL, CHOLX, CHLST, CHOLV, HDL, HDLP, LDL, LDLC, DLDLP, TGLX, TRIGL, TRIGP, CHHD, CHHDX  No results found for: [facility]  Lab Results   Component Value Date/Time    Color DARK YELLOW 03/16/2023 06:47 AM    Appearance CLEAR 03/16/2023 06:47 AM    Specific gravity 1.025 03/16/2023 06:47 AM    pH (UA) 6.5 03/16/2023 06:47 AM    Protein Negative 03/16/2023 06:47 AM    Glucose Negative 03/16/2023 06:47 AM    Ketone TRACE (A) 03/16/2023 06:47 AM    Urobilinogen >8.0 (H) 03/16/2023 06:47 AM    Nitrites Negative 03/16/2023 06:47 AM    Leukocyte Esterase TRACE (A) 03/16/2023 06:47 AM    Epithelial cells FEW 03/16/2023 06:47 AM    Bacteria Negative 03/16/2023 06:47 AM    WBC 0-4 03/16/2023 06:47 AM    RBC 0-5 03/16/2023 06:47 AM       Notes reviewed from all clinical/nonclinical/nursing services involved in patient's clinical care. Care coordination discussions were held with appropriate clinical/nonclinical/ nursing providers based on care coordination needs. Patients current active Medications were reviewed, considered, added and adjusted based on the clinical condition today. Home Medications were reconciled to the best of my ability given all available resources at the time of admission. Route is PO if not otherwise noted.       Admission Status: inpatient      Medications Reviewed:     Current Facility-Administered Medications   Medication Dose Route Frequency    magnesium oxide (MAG-OX) tablet 400 mg  400 mg Oral DAILY    diclofenac (VOLTAREN) 1 % topical gel 4 g  4 g Topical QID    cefTRIAXone (ROCEPHIN) 1 g in 0.9% sodium chloride (MBP/ADV) 50 mL MBP  1 g IntraVENous Q24H    thiamine HCL (B-1) tablet 100 mg  100 mg Oral DAILY    sodium chloride (NS) flush 5-40 mL  5-40 mL IntraVENous Q8H    sodium chloride (NS) flush 5-40 mL  5-40 mL IntraVENous PRN    acetaminophen (TYLENOL) tablet 650 mg  650 mg Oral Q6H PRN    Or    acetaminophen (TYLENOL) suppository 650 mg  650 mg Rectal Q6H PRN    polyethylene glycol (MIRALAX) packet 17 g  17 g Oral DAILY PRN    ondansetron (ZOFRAN ODT) tablet 4 mg  4 mg Oral Q8H PRN    Or    ondansetron (ZOFRAN) injection 4 mg  4 mg IntraVENous Q6H PRN    [Held by provider] enoxaparin (LOVENOX) injection 40 mg  40 mg SubCUTAneous DAILY    lactulose (CHRONULAC) 10 gram/15 mL solution 15 mL  15 mL Oral BID    LORazepam (ATIVAN) injection 1 mg  1 mg IntraVENous Q4H PRN    [Held by provider] LORazepam (ATIVAN) injection 2 mg  2 mg IntraVENous W2P PRN    folic acid (FOLVITE) tablet 1 mg  1 mg Oral DAILY    doxycycline (VIBRA-TABS) tablet 100 mg  100 mg Oral Q12H    morphine injection 2 mg  2 mg IntraVENous Q4H PRN     ______________________________________________________________________  EXPECTED LENGTH OF STAY: 2d 12h  ACTUAL LENGTH OF STAY:          3                 Merissa Rodriguez MD

## 2023-03-19 NOTE — ADVANCED PRACTICE NURSE
Magnesium 1.1 on AM labs. Orders to replete placed. Nursing to recheck level after admin. Nursing to continue to monitor. Magnesium 1.7 after repletion. Potassium 3.3 on AM labs. Will replete. Nursing to continue to monitor.

## 2023-03-19 NOTE — PROGRESS NOTES
Bedside and Verbal shift change report given to Amarilis Saleem (oncoming nurse) by Paige Mcbride (offgoing nurse).  Report included the following information SBAR, Kardex, Intake/Output, MAR, Accordion, Recent Results, and Cardiac Rhythm SR .

## 2023-03-19 NOTE — PROGRESS NOTES
1905 Bedside and Verbal shift change report given to Dede BARRETT (oncoming nurse) by Lashawn Price RN (offgoing nurse). Report included the following information SBAR, Kardex, ED Summary, MAR, Recent Results, Cardiac Rhythm SR, and Quality Measures. [id number] [facility]  (#[id number]) used to discuss plan of care, complete assessment. Pt reports pain in R foot. Dicussed pain management regimen. Pt verbalized understanding. Pt had no questions/concerns at this time. Bed low, call light/belongings in reach, safety precautions in place. 3076 Critical results; Magnesium 0.9. Orders placed to replace magnesium. 0600 No overnight events. VSS. Pt pain managed. Safety measures in place, call light and belongings within reach. Pt resting comfortably. 0700 Bedside and Verbal shift change report given to Amarilis Saleem  (oncoming nurse) by Marla BARRETT (offgoing nurse).

## 2023-03-19 NOTE — PROGRESS NOTES
0730: Report from Pernell Sullivan, [address] [place] [address]. Overnight events, results, orders, and MAR reviewed. 1600: CIWA discontinued. 1730: Three bowel movements today, Lactulose changed to once daily. 1915: Report given to NENA Gutiérrez.

## 2023-03-19 NOTE — PROGRESS NOTES
Mg 400 mg PO once and Mg 2g/50 ml IVMB once, given as ordered. 2672 Blood specimen for Mg, sent to the lab as ordered. 7228 Mg 1.7. NP Sita Retana, made aware. 0655 KCL 10 mEq PO once, given as ordered.

## 2023-03-19 NOTE — PROGRESS NOTES
Problem: Falls - Risk of  Goal: *Absence of Falls  Description: Document Raman Medina Fall Risk and appropriate interventions in the flowsheet. Outcome: Progressing Towards Goal  Note: Fall Risk Interventions:     Problem: Alcohol Withdrawal  Goal: Interventions  Outcome: Progressing Towards Goal     Problem: Patient Education: Go to Patient Education Activity  Goal: Patient/Family Education  Outcome: Progressing Towards Goal     Problem: Pressure Injury - Risk of  Goal: *Prevention of pressure injury  Description: Document Kaleb Scale and appropriate interventions in the flowsheet.   Outcome: Progressing Towards Goal  Note: Pressure Injury Interventions:  Sensory Interventions: Minimize linen layers    Moisture Interventions: Absorbent underpads    Activity Interventions: Increase time out of bed    Mobility Interventions: PT/OT evaluation    Nutrition Interventions: Document food/fluid/supplement intake    Friction and Shear Interventions: Minimize layers    Problem: General Medical Care Plan  Goal: *Vital signs within specified parameters  Outcome: Progressing Towards Goal  Goal: *Labs within defined limits  Outcome: Progressing Towards Goal  Goal: *Absence of infection signs and symptoms  Outcome: Progressing Towards Goal  Goal: *Skin integrity maintained  Outcome: Progressing Towards Goal  Goal: *Fluid volume balance  Outcome: Progressing Towards Goal  Goal: *Progressive mobility and function (eg: ADL's)  Outcome: Progressing Towards Goal

## 2023-03-20 LAB
ALBUMIN SERPL-MCNC: 2.5 G/DL (ref 3.5–5)
ALBUMIN/GLOB SERPL: 0.5 (ref 1.1–2.2)
ALP SERPL-CCNC: 150 U/L (ref 45–117)
ALT SERPL-CCNC: 28 U/L (ref 12–78)
ANION GAP SERPL CALC-SCNC: 5 MMOL/L (ref 5–15)
AST SERPL-CCNC: 50 U/L (ref 15–37)
BASOPHILS # BLD: 0 K/UL (ref 0–0.1)
BASOPHILS NFR BLD: 1 % (ref 0–1)
BILIRUB SERPL-MCNC: 0.7 MG/DL (ref 0.2–1)
BUN SERPL-MCNC: 9 MG/DL (ref 6–20)
BUN/CREAT SERPL: 16 (ref 12–20)
CALCIUM SERPL-MCNC: 8.4 MG/DL (ref 8.5–10.1)
CHLORIDE SERPL-SCNC: 101 MMOL/L (ref 97–108)
CO2 SERPL-SCNC: 25 MMOL/L (ref 21–32)
CREAT SERPL-MCNC: 0.58 MG/DL (ref 0.7–1.3)
DIFFERENTIAL METHOD BLD: ABNORMAL
EOSINOPHIL # BLD: 0.5 K/UL (ref 0–0.4)
EOSINOPHIL NFR BLD: 16 % (ref 0–7)
ERYTHROCYTE [DISTWIDTH] IN BLOOD BY AUTOMATED COUNT: 22.4 % (ref 11.5–14.5)
GLOBULIN SER CALC-MCNC: 5.2 G/DL (ref 2–4)
GLUCOSE SERPL-MCNC: 96 MG/DL (ref 65–100)
HCT VFR BLD AUTO: 32 % (ref 36.6–50.3)
HGB BLD-MCNC: 10.4 G/DL (ref 12.1–17)
IMM GRANULOCYTES # BLD AUTO: 0 K/UL
IMM GRANULOCYTES NFR BLD AUTO: 0 %
LYMPHOCYTES # BLD: 1.2 K/UL (ref 0.8–3.5)
LYMPHOCYTES NFR BLD: 37 % (ref 12–49)
MAGNESIUM SERPL-MCNC: 0.9 MG/DL (ref 1.6–2.4)
MAGNESIUM SERPL-MCNC: 1.4 MG/DL (ref 1.6–2.4)
MCH RBC QN AUTO: 26.5 PG (ref 26–34)
MCHC RBC AUTO-ENTMCNC: 32.5 G/DL (ref 30–36.5)
MCV RBC AUTO: 81.4 FL (ref 80–99)
MONOCYTES # BLD: 0.7 K/UL (ref 0–1)
MONOCYTES NFR BLD: 21 % (ref 5–13)
NEUTS SEG # BLD: 0.8 K/UL (ref 1.8–8)
NEUTS SEG NFR BLD: 25 % (ref 32–75)
NRBC # BLD: 0 K/UL (ref 0–0.01)
NRBC BLD-RTO: 0 PER 100 WBC
PHOSPHATE SERPL-MCNC: 4.1 MG/DL (ref 2.6–4.7)
PLATELET # BLD AUTO: 88 K/UL (ref 150–400)
PMV BLD AUTO: 9.7 FL (ref 8.9–12.9)
POTASSIUM SERPL-SCNC: 3.3 MMOL/L (ref 3.5–5.1)
PROT SERPL-MCNC: 7.7 G/DL (ref 6.4–8.2)
RBC # BLD AUTO: 3.93 M/UL (ref 4.1–5.7)
RBC MORPH BLD: ABNORMAL
SODIUM SERPL-SCNC: 131 MMOL/L (ref 136–145)
WBC # BLD AUTO: 3.2 K/UL (ref 4.1–11.1)

## 2023-03-20 PROCEDURE — 83735 ASSAY OF MAGNESIUM: CPT

## 2023-03-20 PROCEDURE — 74011250637 HC RX REV CODE- 250/637: Performed by: INTERNAL MEDICINE

## 2023-03-20 PROCEDURE — 74011000250 HC RX REV CODE- 250: Performed by: STUDENT IN AN ORGANIZED HEALTH CARE EDUCATION/TRAINING PROGRAM

## 2023-03-20 PROCEDURE — 80053 COMPREHEN METABOLIC PANEL: CPT

## 2023-03-20 PROCEDURE — 97535 SELF CARE MNGMENT TRAINING: CPT | Performed by: OCCUPATIONAL THERAPIST

## 2023-03-20 PROCEDURE — 74011250637 HC RX REV CODE- 250/637: Performed by: STUDENT IN AN ORGANIZED HEALTH CARE EDUCATION/TRAINING PROGRAM

## 2023-03-20 PROCEDURE — 85025 COMPLETE CBC W/AUTO DIFF WBC: CPT

## 2023-03-20 PROCEDURE — 74011000258 HC RX REV CODE- 258: Performed by: INTERNAL MEDICINE

## 2023-03-20 PROCEDURE — 74011250636 HC RX REV CODE- 250/636: Performed by: INTERNAL MEDICINE

## 2023-03-20 PROCEDURE — 65270000032 HC RM SEMIPRIVATE

## 2023-03-20 PROCEDURE — 84100 ASSAY OF PHOSPHORUS: CPT

## 2023-03-20 PROCEDURE — 36415 COLL VENOUS BLD VENIPUNCTURE: CPT

## 2023-03-20 PROCEDURE — 74011250636 HC RX REV CODE- 250/636

## 2023-03-20 PROCEDURE — 97165 OT EVAL LOW COMPLEX 30 MIN: CPT | Performed by: OCCUPATIONAL THERAPIST

## 2023-03-20 RX ORDER — MAGNESIUM SULFATE HEPTAHYDRATE 40 MG/ML
4 INJECTION, SOLUTION INTRAVENOUS ONCE
Status: DISCONTINUED | OUTPATIENT
Start: 2023-03-20 | End: 2023-03-20 | Stop reason: CLARIF

## 2023-03-20 RX ORDER — MAGNESIUM SULFATE HEPTAHYDRATE 40 MG/ML
2 INJECTION, SOLUTION INTRAVENOUS
Status: COMPLETED | OUTPATIENT
Start: 2023-03-20 | End: 2023-03-20

## 2023-03-20 RX ORDER — LANOLIN ALCOHOL/MO/W.PET/CERES
400 CREAM (GRAM) TOPICAL 2 TIMES DAILY
Status: DISCONTINUED | OUTPATIENT
Start: 2023-03-20 | End: 2023-03-22 | Stop reason: HOSPADM

## 2023-03-20 RX ORDER — MAGNESIUM SULFATE HEPTAHYDRATE 40 MG/ML
2 INJECTION, SOLUTION INTRAVENOUS ONCE
Status: COMPLETED | OUTPATIENT
Start: 2023-03-20 | End: 2023-03-20

## 2023-03-20 RX ADMIN — MAGNESIUM SULFATE HEPTAHYDRATE 2 G: 40 INJECTION, SOLUTION INTRAVENOUS at 04:51

## 2023-03-20 RX ADMIN — DOXYCYCLINE HYCLATE 100 MG: 100 TABLET, COATED ORAL at 21:04

## 2023-03-20 RX ADMIN — MAGNESIUM SULFATE HEPTAHYDRATE 2 G: 40 INJECTION, SOLUTION INTRAVENOUS at 18:16

## 2023-03-20 RX ADMIN — MAGNESIUM OXIDE 400 MG (241.3 MG MAGNESIUM) TABLET 400 MG: TABLET at 08:56

## 2023-03-20 RX ADMIN — ACETAMINOPHEN 650 MG: 325 TABLET ORAL at 04:00

## 2023-03-20 RX ADMIN — DICLOFENAC SODIUM 4 G: 10 GEL TOPICAL at 21:04

## 2023-03-20 RX ADMIN — SODIUM CHLORIDE, PRESERVATIVE FREE 10 ML: 5 INJECTION INTRAVENOUS at 21:04

## 2023-03-20 RX ADMIN — MAGNESIUM OXIDE 400 MG (241.3 MG MAGNESIUM) TABLET 400 MG: TABLET at 17:00

## 2023-03-20 RX ADMIN — DOXYCYCLINE HYCLATE 100 MG: 100 TABLET, COATED ORAL at 08:57

## 2023-03-20 RX ADMIN — MAGNESIUM SULFATE HEPTAHYDRATE 2 G: 40 INJECTION, SOLUTION INTRAVENOUS at 06:51

## 2023-03-20 RX ADMIN — FOLIC ACID 1 MG: 1 TABLET ORAL at 08:57

## 2023-03-20 RX ADMIN — Medication 100 MG: at 08:57

## 2023-03-20 RX ADMIN — SODIUM CHLORIDE, PRESERVATIVE FREE 10 ML: 5 INJECTION INTRAVENOUS at 13:28

## 2023-03-20 RX ADMIN — SODIUM CHLORIDE, PRESERVATIVE FREE 10 ML: 5 INJECTION INTRAVENOUS at 06:00

## 2023-03-20 RX ADMIN — POTASSIUM BICARBONATE 40 MEQ: 782 TABLET, EFFERVESCENT ORAL at 08:56

## 2023-03-20 RX ADMIN — DICLOFENAC SODIUM 4 G: 10 GEL TOPICAL at 18:16

## 2023-03-20 RX ADMIN — ACETAMINOPHEN 650 MG: 325 TABLET ORAL at 19:35

## 2023-03-20 RX ADMIN — DICLOFENAC SODIUM 4 G: 10 GEL TOPICAL at 13:00

## 2023-03-20 RX ADMIN — CEFTRIAXONE SODIUM 1 G: 1 INJECTION, POWDER, FOR SOLUTION INTRAMUSCULAR; INTRAVENOUS at 17:00

## 2023-03-20 RX ADMIN — DICLOFENAC SODIUM 4 G: 10 GEL TOPICAL at 08:58

## 2023-03-20 NOTE — PROGRESS NOTES
0715: Report from April, RN. Overnight events, results, orders, MAR reviewed. 1120: Provided a cane for the pt. Was able to clarify pts correct home address, gave to case mgmt. 1515: Mag remains low, orders for repletion placed. 1915: Report to Guzman Wong.

## 2023-03-20 NOTE — PROGRESS NOTES
[facility]  Hospitalist Group                                                                                          Hospitalist Progress Note  Adrienne Sharp MD  Answering service: [phone] from in house phone        Date of Service:  3/20/2023  NAME:  Gabrielle Mejia  :  1975  MRN:  [id number]   Room: [facility]    Admission Summary:   Per HPI: Gabrielle Mejia is a 52 y.o. male who presents with right ankle and right foot pain for several days ago. Patient is a Jamaican-speaking patient had a good historian most of the history was provided by Jamaican-speaking RN at bedside and translating services. Patient is complaining of pain in right foot and right leg. Denied any nausea vomiting abdominal pain. Patient is only AAO x1 cannot tell time date or where he is going on to tell his name PT patient states that he for past 1 year has been drinking really heavily but has not had any drink for past 1 month. Endorsed that he was in [facility] [place] but he got endoscopy and foot repair. Patient denies taking any medication at home. Denies tobacco use any decrease in drug use       Interval history / Subjective: Follow up the patient admitted for treatment of confusion, cellulitis of right leg, CHRIS Pneumonia  NAD, acute events over night noted  Telemetry reviewed: NSR  Alert, oriented and appropriate  Low electrolytes, Magnesium 0.9 and replacement ongoing, will recheck  No fever  Pain RLE improved, aggravated by activity  The patient overall is feeling better, and wants to return to [facility] record from [facility] requested but still pending. Condition of the patient and plan of care was discussed with RN, CM, pharmacy during morning round. No tremor, no hallucination,   Did not require Ativan  RN notes were reviewed     was used. The patient is alert and appropriate.   He is from [facility] and is in [facility] for more than 2 year. He is doing electric and pluming as freelancer and is planning to go back to [place]. The patient came to [place], [place] from [place] as he was referred by his  to go to alcoholic rehab program at [facility] in [place], he dose not remember name of the Temple or address. He was able to provide his address in [email or web address] where he rent place. Neurology input appreciated  MRI brain was reviewed and negative for CVA  EEG was reviewed and negative for seizure  CTA chest was repeated, reviewed independently, no PE per radiologist, + CHRIS Pneumonia  The patient denies having seizure and had last alcohol 3 weeks ago  The patient admits having right ankle fracture for 2 years, he had cast/ splint for 1 year and it was removed 1 year ago. He is able to ambulate and put weight to his right foot. Discussed with ortho, no inpatient procedure was recommended, Ok for WBAT RLE, no boots, OK to follow up with ortho as outpt on non emergent base. Assessment & Plan: Altered mental status, resolved  Acute encephalopathy possible toxic and metabolic related to alcohol and acute illness, resolved  History of heavy alcohol use  -CT head negative for acute process. UDS positive benzodiazepine  -Ammonia 56, down to 33  -Possible related to hepatic encephalopathy versus Wernicke's Korsakoff syndrome due to alcohol use versus metabolic encephalopathy due to alcohol use  -Symptoms were triggered by Ativan   -Lactulose change to daily  -MRI brain negative for acute changes  -CIWA protocol was discontinued  -thiamine Wernicke dose, completed IV, changed to PO  -Folic acid  -C53, folate and TSH: WNL, RPR non reactive  - neurology consulted, recommendations were reviewed, no further evaluation was recommended  EEG was reviewed, negative for seizure, neuro input appreciated. Elevated D-dimer  Suspected pulmonary infarct and possible   Left upper lobe Pneumonia   -CTA with no large pulmonary embolism.   Left upper lobe mass interspersed with air suggestive of pulmonary infarct. Suboptimal contrast  -Duplex no acute DVT  -Patient denies hemoptysis cough or chest pain     -Repeat CTA reviewed: no PE, + CHRIS PNA  -Quant TB given recent immigration pending  -Continue Ceftriaxone and doxycycline for possible bacterial pneumonia          Compensated cirrhosis  Portal hypertension  Coagulopathy  -CT abdomen with portal hypertension and cirrhosis. Trace ascites  -Unknown etiology? Alcohol     -hepatitis panel negative  -Ultrasound liver reviewed  -will need outpatient follow-up with hepatology     Right ankle swelling and redness suspected cellulitis, resolved  -X-ray with chronic distal fibular and medial malleoli fracture and soft tissue swelling  -Continue antibiotic CTX and doxy   -PT OT   US RLE reviewed: no DVT    CHRIS Pneumonia, bacterial  Continue abx: CTX and doxy  Dose not require supplemental O2    Right ankle Fx chronic  Per pt right ankle fracture for 2 years, he had cast or splint for 1 year and it was removed 1 year ago. He is able to ambulate and put weight to his right foot.    XR reviewed  The patient will need follow up ortho as outpt   Follow up PT/OT recommendations  Discussed with ortho on call, reviewed Xrays: no procedures was recommended, dose not need boot, OK WBAT       Thrombocytopenia due to liver cirrhosis, stable  -Monitor platelets    Hypomagnesemia, sever  Continue replacement of Magnesium IV and PO  Will recheck, monitor and replace    Hypokalemia  Replacement IV and PO     Hyponatremia,   Possible related to liver cirrhosis and diarrhea  Continue to monitor, will recheck   Fluids restriction         Code status: Full code  Prophylaxis: SCD, hold Lovenox for now due to low platelets  Care Plan discussed with: patient, RN, CM  Anticipated Disposition: in 24-48 hrs, pending clinical improvement, replacement of electrolytes  Inpatient  Cardiac monitoring: Telemetry     Hospital Problems  Never Reviewed            Codes Class Noted POA    AMS (altered mental status) ICD-10-CM: R41.82  ICD-9-CM: 780.97  3/16/2023 Unknown         Social Determinants of Health     Tobacco Use: Low Risk     Smoking Tobacco Use: Never    Smokeless Tobacco Use: Never    Passive Exposure: Not on file   Alcohol Use: Not on file   Financial Resource Strain: Not on file   Food Insecurity: Not on file   Transportation Needs: Not on file   Physical Activity: Not on file   Stress: Not on file   Social Connections: Not on file   Intimate Partner Violence: Not on file   Depression: Not on file   Housing Stability: Not on file       Review of Systems:   A comprehensive review of systems was negative except for that written in the HPI. Vital Signs:    Last 24hrs VS reviewed since prior progress note. Most recent are:  Visit Vitals  BP 99/64   Pulse 66   Temp 98.1 °F (36.7 °C)   Resp 18   Ht 5' 1.81\" (1.57 m)   Wt 64.4 kg (142 lb)   SpO2 96%   BMI 26.13 kg/m²         Intake/Output Summary (Last 24 hours) at 3/20/2023 1112  Last data filed at 3/20/2023 0747  Gross per 24 hour   Intake 1150 ml   Output --   Net 1150 ml          Physical Examination:     I had a face to face encounter with this patient and independently examined them on 3/20/2023 as outlined below:          General : alert x 3, oriented and appropriate,  awake, no acute distress,   HEENT: PEERL, EOMI, moist mucus membrane, TM clear  Neck: supple, no JVD, no meningeal signs  Chest: Coarse to auscultation bilaterally   CVS: S1 S2 heard, Capillary refill less than 2 seconds  Abd: soft/ non tender, non distended, BS physiological,   Ext: no clubbing, no cyanosis, + resolved edema RLE, brisk 2+ DP pulses  Neuro/Psych: pleasant mood and affect, CN 2-12 grossly intact, sensory grossly within normal limit, Strength 5/5 in all extremities, DTR 1+ x 4  Skin: warm     Data Review:    Review and/or order of clinical lab test    US liver:  IMPRESSION     1.  Cirrhotic liver morphology. No focal hepatic mass is seen. 2. Gallbladder wall thickening which may be related to the under distention and  chronic liver disease. 3. Normal sonographic appearance of the RIGHT kidney. US RLE: No evidence of deep vein thrombosis in the right lower extremity    MRI brain:  IMPRESSION   1. Evaluation is limited by motion. No acute intracranial abnormality. 2. Generalized parenchymal volume loss, advanced for age. Mild chronic  microvascular ischemic disease. 3. Symmetric T1 shortening in the bilateral globus pallidus, most consistent  with chronic liver disease. Repeat CTA chest 03/18/2023:  IMPRESSION  1. Patchy airspace opacities in the left upper lobe which is which may  represent infection. 2.  Limited study for evaluation of pulmonary embolus. No large pulmonary  embolus is identified. 3.  Other incidental findings as above       I have personally and independently reviewed all pertinent labs, diagnostic studies, imaging, and have provided independent interpretation of the same. Labs:     Recent Labs     03/20/23 0352 03/19/23  0009   WBC 3.2* 3.2*   HGB 10.4* 10.9*   HCT 32.0* 34.5*   PLT 88* 77*       Recent Labs     03/20/23  0352 03/19/23  0551 03/19/23  0009 03/18/23  0001   *  --  137 136   K 3.3*  --  3.3* 3.2*     --  103 101   CO2 25  --  25 26   BUN 9  --  10 8   CREA 0.58*  --  0.60* 0.60*   GLU 96  --  116* 103*   CA 8.4*  --  8.5 8.1*   MG 0.9* 1.7 1.1* 1.1*   PHOS 4.1  --   --  2.8       Recent Labs     03/20/23  0352 03/19/23  0009 03/18/23  0001   ALT 28 36 40   * 154* 150*   TBILI 0.7 0.9 1.1*   TP 7.7 8.2 8.3*   ALB 2.5* 2.6* 2.7*   GLOB 5.2* 5.6* 5.6*       Recent Labs     03/19/23  0009   INR 1.5*   PTP 15.1*        No results for input(s): FE, TIBC, PSAT, FERR in the last 72 hours. Lab Results   Component Value Date/Time    Folate 12.4 03/16/2023 06:50 PM        No results for input(s): PH, PCO2, PO2 in the last 72 hours.   No results for input(s): CPK, CKNDX, TROIQ in the last 72 hours. No lab exists for component: CPKMB  No results found for: CHOL, CHOLX, CHLST, CHOLV, HDL, HDLP, LDL, LDLC, DLDLP, TGLX, TRIGL, TRIGP, CHHD, CHHDX  No results found for: [facility]  Lab Results   Component Value Date/Time    Color DARK YELLOW 03/16/2023 06:47 AM    Appearance CLEAR 03/16/2023 06:47 AM    Specific gravity 1.025 03/16/2023 06:47 AM    pH (UA) 6.5 03/16/2023 06:47 AM    Protein Negative 03/16/2023 06:47 AM    Glucose Negative 03/16/2023 06:47 AM    Ketone TRACE (A) 03/16/2023 06:47 AM    Urobilinogen >8.0 (H) 03/16/2023 06:47 AM    Nitrites Negative 03/16/2023 06:47 AM    Leukocyte Esterase TRACE (A) 03/16/2023 06:47 AM    Epithelial cells FEW 03/16/2023 06:47 AM    Bacteria Negative 03/16/2023 06:47 AM    WBC 0-4 03/16/2023 06:47 AM    RBC 0-5 03/16/2023 06:47 AM       Notes reviewed from all clinical/nonclinical/nursing services involved in patient's clinical care. Care coordination discussions were held with appropriate clinical/nonclinical/ nursing providers based on care coordination needs. Patients current active Medications were reviewed, considered, added and adjusted based on the clinical condition today. Home Medications were reconciled to the best of my ability given all available resources at the time of admission. Route is PO if not otherwise noted.       Admission Status: inpatient      Medications Reviewed:     Current Facility-Administered Medications   Medication Dose Route Frequency    magnesium oxide (MAG-OX) tablet 400 mg  400 mg Oral DAILY    lactulose (CHRONULAC) 10 gram/15 mL solution 15 mL  15 mL Oral DAILY    diclofenac (VOLTAREN) 1 % topical gel 4 g  4 g Topical QID    cefTRIAXone (ROCEPHIN) 1 g in 0.9% sodium chloride (MBP/ADV) 50 mL MBP  1 g IntraVENous Q24H    thiamine HCL (B-1) tablet 100 mg  100 mg Oral DAILY    sodium chloride (NS) flush 5-40 mL  5-40 mL IntraVENous Q8H    sodium chloride (NS) flush 5-40 mL  5-40 mL IntraVENous PRN    acetaminophen (TYLENOL) tablet 650 mg  650 mg Oral Q6H PRN    Or    acetaminophen (TYLENOL) suppository 650 mg  650 mg Rectal Q6H PRN    polyethylene glycol (MIRALAX) packet 17 g  17 g Oral DAILY PRN    ondansetron (ZOFRAN ODT) tablet 4 mg  4 mg Oral Q8H PRN    Or    ondansetron (ZOFRAN) injection 4 mg  4 mg IntraVENous Q6H PRN    [Held by provider] enoxaparin (LOVENOX) injection 40 mg  40 mg SubCUTAneous DAILY    LORazepam (ATIVAN) injection 1 mg  1 mg IntraVENous Q4H PRN    [Held by provider] LORazepam (ATIVAN) injection 2 mg  2 mg IntraVENous W0D PRN    folic acid (FOLVITE) tablet 1 mg  1 mg Oral DAILY    doxycycline (VIBRA-TABS) tablet 100 mg  100 mg Oral Q12H    morphine injection 2 mg  2 mg IntraVENous Q4H PRN     ______________________________________________________________________  EXPECTED LENGTH OF STAY: 5d 0h  ACTUAL LENGTH OF STAY:          4                 Christopher Alejandra MD

## 2023-03-20 NOTE — PROGRESS NOTES
1900 Bedside and Verbal shift change report given to April RN (oncoming nurse) by Elvira Sim RN (offgoing nurse). Report included the following information SBAR, Intake/Output, MAR, Recent Results, and Quality Measures. 0440 Unable to obtain labs. Multiple attempts by 2 nurses. 0700 Bedside and Verbal shift change report given to Alec Box (oncoming nurse) by Marla BARRETT (offgoing nurse).

## 2023-03-20 NOTE — PROGRESS NOTES
Communication Note    Patient speaks Azerbaijani as their preferred language for their healthcare communication, please reach out to Language Services for  services at:     Negrito Maxwell Senior  - Navigator - [phone]    Email: [email or web address]  General phone: [phone] ([phone])    Our interpreters are available for team members working with limited English proficient (LEP) patients remotely, in person as well as phone or video interpreters on the [employer]. For the LATEST Language services updates/ resources please see our Language Services page on:[facility] under the Clinical Resources tab. Please always document the use of  services (name and/or number of ) in your clinical notes.

## 2023-03-20 NOTE — PROGRESS NOTES
NIKOLAS:    Plan:  Discharge planning  Payor source assistance  Healthcare/housing resources   Transportation assistance ? RUR: 10%    IDR: CM discussed pt's discharge plan with MD, nurse, and pharmacist in [address] [place] [address] at 10:00 am. Pt medically stable. Nurse reports that pt has a house in [place], [place]. He does not know the address, but knows how to get there if he can get to [place]. Nurse looked through pt's belongings and did not find any documents/information. CM working on getting a copy of the EMS report. 10:30 am: CM added [address] [facility] [address], and [address] to pt's AVS.    10:50 am: CM spoke with Toyin Oates, wound care nurse, who met with pt this morning. Pt gave Yolanda Maria his address in [place]: [address] [place] [address]. Pt said that he lives in the basement of a house. 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 Pt told Yolanda Maria that a [employer] sponsored his travel to [place], but he cannot remember the name of the [employer]. CM to work on verifying address. Pt is medically stable for discharge. 11:20 am: CM met with pt and verified the address that pt provided to the nurse. CM used [facility]  services to communicate with pt. : Paula Pichardo, # [phone]. Pt verified the address provided to the nurse. CM asked pt for permission to contact the number associated with that address on [facility]. Pt gave permission for CM to call the number, but said that the owner is in [facility] right now and may not be able to answer. CM called the number ([phone]) and received the message that the number is not in service.     BRENT Francisco, Care Manager  [phone]
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
## 2023-03-20 NOTE — PROGRESS NOTES
Problem: Falls - Risk of  Goal: *Absence of Falls  Description: Document Uriel Comment Fall Risk and appropriate interventions in the flowsheet.   Outcome: Progressing Towards Goal  Note: Fall Risk Interventions:     Problem: General Medical Care Plan  Goal: *Vital signs within specified parameters  Outcome: Progressing Towards Goal  Goal: *Labs within defined limits  Outcome: Progressing Towards Goal  Goal: *Absence of infection signs and symptoms  Outcome: Progressing Towards Goal  Goal: *Skin integrity maintained  Outcome: Progressing Towards Goal

## 2023-03-20 NOTE — PROGRESS NOTES
Patients case reviewed during interdisciplinary team meeting in Med Surg/Tele Unit 2.  Rev.  Kristyn Garces 33, [address]

## 2023-03-21 LAB
ALBUMIN SERPL-MCNC: 2.6 G/DL (ref 3.5–5)
ALBUMIN/GLOB SERPL: 0.5 (ref 1.1–2.2)
ALP SERPL-CCNC: 144 U/L (ref 45–117)
ALT SERPL-CCNC: 36 U/L (ref 12–78)
ANION GAP SERPL CALC-SCNC: 5 MMOL/L (ref 5–15)
AST SERPL-CCNC: 61 U/L (ref 15–37)
BASOPHILS # BLD: 0.1 K/UL (ref 0–0.1)
BASOPHILS NFR BLD: 2 % (ref 0–1)
BILIRUB SERPL-MCNC: 0.8 MG/DL (ref 0.2–1)
BUN SERPL-MCNC: 8 MG/DL (ref 6–20)
BUN/CREAT SERPL: 15 (ref 12–20)
CALCIUM SERPL-MCNC: 8.1 MG/DL (ref 8.5–10.1)
CHLORIDE SERPL-SCNC: 103 MMOL/L (ref 97–108)
CO2 SERPL-SCNC: 27 MMOL/L (ref 21–32)
CREAT SERPL-MCNC: 0.54 MG/DL (ref 0.7–1.3)
DIFFERENTIAL METHOD BLD: ABNORMAL
EOSINOPHIL # BLD: 0.4 K/UL (ref 0–0.4)
EOSINOPHIL NFR BLD: 14 % (ref 0–7)
ERYTHROCYTE [DISTWIDTH] IN BLOOD BY AUTOMATED COUNT: 22.5 % (ref 11.5–14.5)
GLOBULIN SER CALC-MCNC: 5.3 G/DL (ref 2–4)
GLUCOSE SERPL-MCNC: 95 MG/DL (ref 65–100)
HCT VFR BLD AUTO: 33.8 % (ref 36.6–50.3)
HGB BLD-MCNC: 10.9 G/DL (ref 12.1–17)
IMM GRANULOCYTES # BLD AUTO: 0 K/UL (ref 0–0.04)
IMM GRANULOCYTES NFR BLD AUTO: 0 % (ref 0–0.5)
LYMPHOCYTES # BLD: 1 K/UL (ref 0.8–3.5)
LYMPHOCYTES NFR BLD: 36 % (ref 12–49)
M TB IFN-G BLD-IMP: NEGATIVE
M TB IFN-G CD4+ T-CELLS BLD-ACNC: 0.42 IU/ML
M TBIFN-G CD4+ CD8+T-CELLS BLD-ACNC: 0.48 IU/ML
MAGNESIUM SERPL-MCNC: 1.2 MG/DL (ref 1.6–2.4)
MAGNESIUM SERPL-MCNC: 1.8 MG/DL (ref 1.6–2.4)
MCH RBC QN AUTO: 27.2 PG (ref 26–34)
MCHC RBC AUTO-ENTMCNC: 32.2 G/DL (ref 30–36.5)
MCV RBC AUTO: 84.3 FL (ref 80–99)
MONOCYTES # BLD: 0.8 K/UL (ref 0–1)
MONOCYTES NFR BLD: 25 % (ref 5–13)
NEUTS SEG # BLD: 0.7 K/UL (ref 1.8–8)
NEUTS SEG NFR BLD: 23 % (ref 32–75)
NRBC # BLD: 0 K/UL (ref 0–0.01)
NRBC BLD-RTO: 0 PER 100 WBC
PLATELET # BLD AUTO: 93 K/UL (ref 150–400)
PMV BLD AUTO: 9.3 FL (ref 8.9–12.9)
POTASSIUM SERPL-SCNC: 3.7 MMOL/L (ref 3.5–5.1)
PROT SERPL-MCNC: 7.9 G/DL (ref 6.4–8.2)
QUANTIFERON CRITERIA, QFI1T: NORMAL
QUANTIFERON MITOGEN VALUE: 7.55 IU/ML
QUANTIFERON NIL VALUE: 0.35 IU/ML
RBC # BLD AUTO: 4.01 M/UL (ref 4.1–5.7)
RBC MORPH BLD: ABNORMAL
SODIUM SERPL-SCNC: 135 MMOL/L (ref 136–145)
WBC # BLD AUTO: 3 K/UL (ref 4.1–11.1)

## 2023-03-21 PROCEDURE — 83735 ASSAY OF MAGNESIUM: CPT

## 2023-03-21 PROCEDURE — 80053 COMPREHEN METABOLIC PANEL: CPT

## 2023-03-21 PROCEDURE — 74011250637 HC RX REV CODE- 250/637: Performed by: STUDENT IN AN ORGANIZED HEALTH CARE EDUCATION/TRAINING PROGRAM

## 2023-03-21 PROCEDURE — 36415 COLL VENOUS BLD VENIPUNCTURE: CPT

## 2023-03-21 PROCEDURE — 36600 WITHDRAWAL OF ARTERIAL BLOOD: CPT

## 2023-03-21 PROCEDURE — 65270000032 HC RM SEMIPRIVATE

## 2023-03-21 PROCEDURE — 74011000250 HC RX REV CODE- 250: Performed by: STUDENT IN AN ORGANIZED HEALTH CARE EDUCATION/TRAINING PROGRAM

## 2023-03-21 PROCEDURE — 74011250636 HC RX REV CODE- 250/636: Performed by: INTERNAL MEDICINE

## 2023-03-21 PROCEDURE — 74011250637 HC RX REV CODE- 250/637: Performed by: INTERNAL MEDICINE

## 2023-03-21 PROCEDURE — 85025 COMPLETE CBC W/AUTO DIFF WBC: CPT

## 2023-03-21 PROCEDURE — 74011000258 HC RX REV CODE- 258: Performed by: INTERNAL MEDICINE

## 2023-03-21 RX ORDER — MAGNESIUM SULFATE 1 G/100ML
1 INJECTION INTRAVENOUS
Status: DISPENSED | OUTPATIENT
Start: 2023-03-21 | End: 2023-03-21

## 2023-03-21 RX ORDER — MAGNESIUM SULFATE HEPTAHYDRATE 40 MG/ML
2 INJECTION, SOLUTION INTRAVENOUS
Status: DISPENSED | OUTPATIENT
Start: 2023-03-21 | End: 2023-03-21

## 2023-03-21 RX ORDER — LANOLIN ALCOHOL/MO/W.PET/CERES
400 CREAM (GRAM) TOPICAL 2 TIMES DAILY
Qty: 10 TABLET | Refills: 0 | Status: SHIPPED | OUTPATIENT
Start: 2023-03-21 | End: 2023-03-26

## 2023-03-21 RX ORDER — NALOXONE HYDROCHLORIDE 0.4 MG/ML
0.4 INJECTION, SOLUTION INTRAMUSCULAR; INTRAVENOUS; SUBCUTANEOUS AS NEEDED
Status: DISCONTINUED | OUTPATIENT
Start: 2023-03-21 | End: 2023-03-22 | Stop reason: HOSPADM

## 2023-03-21 RX ORDER — MAGNESIUM SULFATE HEPTAHYDRATE 40 MG/ML
4 INJECTION, SOLUTION INTRAVENOUS ONCE
Status: DISCONTINUED | OUTPATIENT
Start: 2023-03-21 | End: 2023-03-21

## 2023-03-21 RX ORDER — FOLIC ACID 1 MG/1
1 TABLET ORAL DAILY
Qty: 10 TABLET | Refills: 0 | Status: SHIPPED | OUTPATIENT
Start: 2023-03-22 | End: 2023-04-01

## 2023-03-21 RX ORDER — LANOLIN ALCOHOL/MO/W.PET/CERES
100 CREAM (GRAM) TOPICAL DAILY
Qty: 10 TABLET | Refills: 0 | Status: SHIPPED | OUTPATIENT
Start: 2023-03-22 | End: 2023-04-01

## 2023-03-21 RX ORDER — DOXYCYCLINE HYCLATE 100 MG
100 TABLET ORAL 2 TIMES DAILY
Qty: 10 TABLET | Refills: 0 | Status: SHIPPED | OUTPATIENT
Start: 2023-03-21 | End: 2023-03-26

## 2023-03-21 RX ADMIN — DICLOFENAC SODIUM 4 G: 10 GEL TOPICAL at 21:21

## 2023-03-21 RX ADMIN — POTASSIUM BICARBONATE 20 MEQ: 782 TABLET, EFFERVESCENT ORAL at 11:24

## 2023-03-21 RX ADMIN — Medication 100 MG: at 09:14

## 2023-03-21 RX ADMIN — DOXYCYCLINE HYCLATE 100 MG: 100 TABLET, COATED ORAL at 09:14

## 2023-03-21 RX ADMIN — DICLOFENAC SODIUM 4 G: 10 GEL TOPICAL at 13:51

## 2023-03-21 RX ADMIN — DICLOFENAC SODIUM 4 G: 10 GEL TOPICAL at 09:15

## 2023-03-21 RX ADMIN — MAGNESIUM OXIDE 400 MG (241.3 MG MAGNESIUM) TABLET 400 MG: TABLET at 17:06

## 2023-03-21 RX ADMIN — MAGNESIUM SULFATE HEPTAHYDRATE 2 G: 40 INJECTION, SOLUTION INTRAVENOUS at 11:26

## 2023-03-21 RX ADMIN — SODIUM CHLORIDE, PRESERVATIVE FREE 10 ML: 5 INJECTION INTRAVENOUS at 21:21

## 2023-03-21 RX ADMIN — DOXYCYCLINE HYCLATE 100 MG: 100 TABLET, COATED ORAL at 21:21

## 2023-03-21 RX ADMIN — CEFTRIAXONE SODIUM 1 G: 1 INJECTION, POWDER, FOR SOLUTION INTRAMUSCULAR; INTRAVENOUS at 17:04

## 2023-03-21 RX ADMIN — MAGNESIUM SULFATE HEPTAHYDRATE 1 G: 1 INJECTION, SOLUTION INTRAVENOUS at 13:51

## 2023-03-21 RX ADMIN — ACETAMINOPHEN 650 MG: 325 TABLET ORAL at 20:12

## 2023-03-21 RX ADMIN — MAGNESIUM OXIDE 400 MG (241.3 MG MAGNESIUM) TABLET 400 MG: TABLET at 09:14

## 2023-03-21 RX ADMIN — SODIUM CHLORIDE, PRESERVATIVE FREE 10 ML: 5 INJECTION INTRAVENOUS at 06:00

## 2023-03-21 RX ADMIN — LACTULOSE 15 ML: 20 SOLUTION ORAL at 09:14

## 2023-03-21 RX ADMIN — DICLOFENAC SODIUM 4 G: 10 GEL TOPICAL at 17:05

## 2023-03-21 RX ADMIN — FOLIC ACID 1 MG: 1 TABLET ORAL at 09:14

## 2023-03-21 NOTE — PROGRESS NOTES
Comprehensive Nutrition Assessment    Type and Reason for Visit: (P) Initial    Nutrition Recommendations/Plan:   Diet as tolerated  Cont Wernicke thiamine dose, folate, add MV     Malnutrition Assessment:  No malnutrition    Nutrition Assessment:    (P) Pt admitted with AMS (resolved), met enceph likely 2' heavy ETOH use/compensated cirrhosis, R leg/ankle swelling and cellulitis, resolving, CHRIS PNA, abx, no need for abx. A&O x 1, hx heavy ETOH use, no alcohol x 3 weeks PTA. Hx notable for polysubstance abuse per UDS. Nutrition Related Findings:    (P) Pt tolerating diet Wound Type: (P) Partial thickness (cellulitis)    Current Nutrition Intake & Therapies:  Average Meal Intake: (P) %  Average Supplement Intake: (P) None ordered  ADULT DIET Regular; No Salt Added (3-4 gm); Low Potassium (Less than 3000 mg/day); 1500 ml    Anthropometric Measures:  Height: (P) 5' 1.81\" (157 cm)  Ideal Body Weight (IBW): (P) 117 lbs ((P) 53 kg)     Current Body Wt:  (P) 65 kg (143 lb 3.2 oz), (P) 122.4 % IBW. (P) Standing scale  Current BMI (kg/m2): (P) 26.4        Weight Adjustment: (P) No adjustment                 BMI Category: (P) Overweight (BMI 25.0-29. 9)    Estimated Daily Nutrient Needs:  Energy Requirements Based On: (P) Kcal/kg  Weight Used for Energy Requirements: (P) Current  Energy (kcal/day): (P) 1820  Weight Used for Protein Requirements: (P) Current  Protein (g/day): (P) 72  Method Used for Fluid Requirements: (P)  (FR)  Fluid (ml/day): (P) 1500    Nutrition Diagnosis:   (P) No nutrition diagnosis at this time related to   as evidenced by      Nutrition Interventions:   Food and/or Nutrient Delivery: (P) Continue current diet  Nutrition Education/Counseling: (P) No recommendations at this time  Coordination of Nutrition Care: (P) No recommendation at this time       Goals:     Goals: (P) Meet at least 75% of estimated needs, prior to discharge       Nutrition Monitoring and Evaluation: Behavioral-Environmental Outcomes: (P) None identified  Food/Nutrient Intake Outcomes: (P) Food and nutrient intake  Physical Signs/Symptoms Outcomes: (P) None identified    Discharge Planning:    (P) No discharge needs at this time    Orville Ashraf, PhD, [address] [place] [address] Dr  Contact: [phone]

## 2023-03-21 NOTE — PROGRESS NOTES
Problem: Falls - Risk of  Goal: *Absence of Falls  Description: Document Kateryna Minor Fall Risk and appropriate interventions in the flowsheet.   Outcome: Progressing Towards Goal  Note: Fall Risk Interventions:

## 2023-03-21 NOTE — PROGRESS NOTES
[facility] Adult  Hospitalist Group                                                                                          Hospitalist Progress Note  Mike Kraft MD  Answering service: [phone] from in house phone        Date of Service:  3/21/2023  NAME:  Porsha Fisher  :  1975  MRN:  [id number]   Room: [facility]    Admission Summary:   Per HPI: Porsha Fisher is a 52 y.o. male who presents with right ankle and right foot pain for several days ago. Patient is a Bangladeshi-speaking patient had a good historian most of the history was provided by Bangladeshi-speaking RN at bedside and translating services. Patient is complaining of pain in right foot and right leg. Denied any nausea vomiting abdominal pain. Patient is only AAO x1 cannot tell time date or where he is going on to tell his name PT patient states that he for past 1 year has been drinking really heavily but has not had any drink for past 1 month. Endorsed that he was in [facility] but he got endoscopy and foot repair. Patient denies taking any medication at home. Denies tobacco use any decrease in drug use       Interval history / Subjective: Follow up the patient admitted for treatment of confusion, cellulitis of right leg, CHRIS Pneumonia  NAD, acute events over night   Telemetry reviewed: NSR  Alert, oriented, appropriate  No tremor, no hallucination,   Did not require Ativan, last Ativan was given   Low electrolytes, and still requiring correction with IV: Magnesium 1.4  No fever   RN notes were reviewed  Pain RLE improved, able to ambulate WBAT RLE  The patient wants to return to [facility], and believes RNs should buy for him a bus ticket  Condition of the patient and plan of care was discussed with RN, CM, pharmacy during morning round. PT noted were reviewed, input appreciated.   Medical record from [address] reviewed, last admission to [facility] 2021 with DT's.  was used. The patient is alert and appropriate. He is from [facility] and is in [place] for more than 2 year. He is doing electric and pluming maintenance as freelancer and is planning to go back to [facility]. The patient came to [address] [place] [address] from [facility] as he was referred by his  to go to alcoholic rehab program at [facility] in [address] [place] [address], he dose not remember name of the Mormon or address. He was able to provide his address in [facility] where he rent a place. Neurology input appreciated  MRI brain was reviewed and negative for CVA  EEG was reviewed and negative for seizure  CTA chest was repeated, reviewed independently, no PE per radiologist, + CHRIS Pneumonia  The patient denies having seizure and had last alcohol 3 weeks ago  The patient admits having right ankle fracture for 2 years, he had cast/ splint for 1 year and it was removed 1 year ago. He is able to ambulate and put weight to his right foot. Discussed with ortho, no inpatient procedure was recommended, Ok for WBAT RLE, no boots, OK to follow up with ortho as outpt on non emergent base. Assessment & Plan: Altered mental status, resolved  Acute encephalopathy possible toxic and metabolic related to alcohol and acute illness, resolved  History of heavy alcohol use  -CT head negative for acute process.   UDS positive benzodiazepine  -Ammonia improved from 56 down to 33  -Possible related to hepatic encephalopathy versus Wernicke's Korsakoff syndrome due to alcohol use versus metabolic encephalopathy due to alcohol use  -Symptoms were triggered by Ativan   -Lactulose change to once daily  -MRI brain negative for acute changes or CVA  -CIWA protocol was discontinued, pt did not require Ativan since 03/16  -completed thiamine Wernicke dose IV, continue B1 PO daily  -continue Folic acid  -A25, folate and TSH: WNL, RPR non reactive  - neurology consulted, recommendations were reviewed, no further intervention was recommended  EEG was reviewed, negative for seizure per neuro, input appreciated        Elevated D-dimer  Suspected pulmonary infarct and possible   Left upper lobe Pneumonia   -CTA with no large pulmonary embolism. Left upper lobe mass interspersed with air suggestive of pulmonary infarct. Suboptimal contrast  -Duplex no acute DVT  -Patient denies hemoptysis cough or chest pain      -Repeat CTA reviewed: no PE, + CHRIS PNA  -Quant TB given recent immigration pending  -Continue Ceftriaxone and doxycycline for possible bacterial pneumonia  day #5, will complete Doxy as outpatient. Compensated cirrhosis  Portal hypertension  Coagulopathy  -CT abdomen with portal hypertension and cirrhosis. Trace ascites  -Alcohol induced   -hepatitis panel negative  -Ultrasound liver reviewed  -will need outpatient follow-up with hepatology     Right ankle swelling and redness suspected cellulitis, resolved  -X-ray with chronic distal fibular and medial malleoli fracture and soft tissue swelling  -Continue antibiotic CTX and doxy   -PT OT   US RLE reviewed: no DVT    CHRIS Pneumonia, bacterial  Continue abx: CTX and doxy  Dose not require supplemental O2    Right ankle Fx chronic  Per pt right ankle fracture for 2 years, he had cast or splint for 1 year and it was removed 1 year ago. He is able to ambulate and put weight to his right foot.    XR reviewed  The patient will need follow up ortho as outpt   Follow up PT/OT recommendations  Discussed with ortho on call, reviewed Xrays: no procedures was recommended, dose not need boot, OK WBAT       Thrombocytopenia due to liver cirrhosis, stable  -Monitor platelets  No acute bleeding    Hypomagnesemia, sever  Still requiring replacement of Magnesium IV and PO  Will recheck, monitor and replace    Hypokalemia  Replaced with IV and PO   Will give KCl 20 mEq x1 today    Hyponatremia, improved  Possible related to liver cirrhosis and diarrhea  Continue to monitor, will recheck   Fluids restriction requested    Social determinant of health  The patient is from [facility]  Dose not have family support  Alcohol dependency  Pt decided not to go to alcoholic rehab at [facility] in . Krishna Dubois consulted for discharge planing, and arrangement of bus tichet back to [facility] vs alcoholic rehab in [facility], Anuj        Code status: Full code  Prophylaxis: SCD, hold Lovenox for now due to low platelets  Care Plan discussed with: patient, RN, CM  Anticipated Disposition: in 24-hrs, continue replacement of electrolytes, pending discharge arrangements per [facility]    Inpatient  Cardiac monitoring: Telemetry        Hospital Problems  Never Reviewed            Codes Class Noted POA    AMS (altered mental status) ICD-10-CM: R41.82  ICD-9-CM: 780.97  3/16/2023 Unknown         Social Determinants of Health     Tobacco Use: Low Risk     Smoking Tobacco Use: Never    Smokeless Tobacco Use: Never    Passive Exposure: Not on file   Alcohol Use: Not on file   Financial Resource Strain: Not on file   Food Insecurity: Not on file   Transportation Needs: Not on file   Physical Activity: Not on file   Stress: Not on file   Social Connections: Not on file   Intimate Partner Violence: Not on file   Depression: Not on file   Housing Stability: Not on file       Review of Systems:   A comprehensive review of systems was negative except for that written in the HPI. Vital Signs:    Last 24hrs VS reviewed since prior progress note.  Most recent are:  Visit Vitals  /65   Pulse 60   Temp 98.6 °F (37 °C)   Resp 16   Ht 5' 1.81\" (1.57 m)   Wt 65 kg (143 lb 3.2 oz)   SpO2 99%   BMI 26.35 kg/m²         Intake/Output Summary (Last 24 hours) at 3/21/2023 1241  Last data filed at 3/20/2023 1530  Gross per 24 hour   Intake 500 ml   Output --   Net 500 ml        Physical Examination:     I had a face to face encounter with this patient and independently examined them on 3/21/2023 as outlined below:          General : alert x 3, oriented and appropriate, no acute distress, comfortable  HEENT: PEERL, EOMI, moist mucus membrane,   Neck: supple, no JVD, no meningeal signs  Chest: Coarse to auscultation bilaterally   CVS: S1 S2 heard, Capillary refill less than 2 seconds  Abd: soft/ non tender, non distended, BS physiological,   Ext: no clubbing, no cyanosis, + resolved edema RLE, brisk 2+ DP pulses  Neuro/Psych: pleasant mood and affect, CN 2-12 grossly intact, sensory grossly within normal limit, Strength 5/5 in all extremities, DTR 1+ x 4  Skin: warm     Data Review:    Review and/or order of clinical lab test    US liver:  IMPRESSION     1. Cirrhotic liver morphology. No focal hepatic mass is seen. 2. Gallbladder wall thickening which may be related to the under distention and  chronic liver disease. 3. Normal sonographic appearance of the RIGHT kidney. US RLE: No evidence of deep vein thrombosis in the right lower extremity    MRI brain:  IMPRESSION   1. Evaluation is limited by motion. No acute intracranial abnormality. 2. Generalized parenchymal volume loss, advanced for age. Mild chronic  microvascular ischemic disease. 3. Symmetric T1 shortening in the bilateral globus pallidus, most consistent  with chronic liver disease. Repeat CTA chest 03/18/2023:  IMPRESSION  1. Patchy airspace opacities in the left upper lobe which is which may  represent infection. 2.  Limited study for evaluation of pulmonary embolus. No large pulmonary  embolus is identified. 3.  Other incidental findings as above       I have personally and independently reviewed all pertinent labs, diagnostic studies, imaging, and have provided independent interpretation of the same.      Labs:     Recent Labs     03/21/23  0819 03/20/23  0352   WBC 3.0* 3.2*   HGB 10.9* 10.4*   HCT 33.8* 32.0*   PLT 93* 88*     Recent Labs     03/21/23  0819 03/20/23  1519 03/20/23  0352 03/19/23  0551 03/19/23  0009   *  --  131*  --  137   K 3.7  --  3.3*  --  3.3*   CL 103  --  101  --  103   CO2 27  --  25  --  25   BUN 8  --  9  --  10   CREA 0.54*  --  0.58*  --  0.60*   GLU 95  --  96  --  116*   CA 8.1*  --  8.4*  --  8.5   MG 1.2* 1.4* 0.9*   < > 1.1*   PHOS  --   --  4.1  --   --     < > = values in this interval not displayed. Recent Labs     03/21/23  0819 03/20/23  0352 03/19/23  0009   ALT 36 28 36   * 150* 154*   TBILI 0.8 0.7 0.9   TP 7.9 7.7 8.2   ALB 2.6* 2.5* 2.6*   GLOB 5.3* 5.2* 5.6*     Recent Labs     03/19/23  0009   INR 1.5*   PTP 15.1*      No results for input(s): FE, TIBC, PSAT, FERR in the last 72 hours. Lab Results   Component Value Date/Time    Folate 12.4 03/16/2023 06:50 PM        No results for input(s): PH, PCO2, PO2 in the last 72 hours. No results for input(s): CPK, CKNDX, TROIQ in the last 72 hours. No lab exists for component: CPKMB  No results found for: CHOL, CHOLX, CHLST, CHOLV, HDL, HDLP, LDL, LDLC, DLDLP, TGLX, TRIGL, TRIGP, CHHD, CHHDX  No results found for: [facility]  Lab Results   Component Value Date/Time    Color DARK YELLOW 03/16/2023 06:47 AM    Appearance CLEAR 03/16/2023 06:47 AM    Specific gravity 1.025 03/16/2023 06:47 AM    pH (UA) 6.5 03/16/2023 06:47 AM    Protein Negative 03/16/2023 06:47 AM    Glucose Negative 03/16/2023 06:47 AM    Ketone TRACE (A) 03/16/2023 06:47 AM    Urobilinogen >8.0 (H) 03/16/2023 06:47 AM    Nitrites Negative 03/16/2023 06:47 AM    Leukocyte Esterase TRACE (A) 03/16/2023 06:47 AM    Epithelial cells FEW 03/16/2023 06:47 AM    Bacteria Negative 03/16/2023 06:47 AM    WBC 0-4 03/16/2023 06:47 AM    RBC 0-5 03/16/2023 06:47 AM       Notes reviewed from all clinical/nonclinical/nursing services involved in patient's clinical care. Care coordination discussions were held with appropriate clinical/nonclinical/ nursing providers based on care coordination needs. Patients current active Medications were reviewed, considered, added and adjusted based on the clinical condition today. Home Medications were reconciled to the best of my ability given all available resources at the time of admission. Route is PO if not otherwise noted.       Admission Status: inpatient      Medications Reviewed:     Current Facility-Administered Medications   Medication Dose Route Frequency    naloxone (NARCAN) injection 0.4 mg  0.4 mg IntraMUSCular PRN    magnesium sulfate 2 g/50 ml IVPB (premix or compounded)  2 g IntraVENous Q2H    magnesium oxide (MAG-OX) tablet 400 mg  400 mg Oral BID    lactulose (CHRONULAC) 10 gram/15 mL solution 15 mL  15 mL Oral DAILY    diclofenac (VOLTAREN) 1 % topical gel 4 g  4 g Topical QID    cefTRIAXone (ROCEPHIN) 1 g in 0.9% sodium chloride (MBP/ADV) 50 mL MBP  1 g IntraVENous Q24H    thiamine HCL (B-1) tablet 100 mg  100 mg Oral DAILY    sodium chloride (NS) flush 5-40 mL  5-40 mL IntraVENous Q8H    sodium chloride (NS) flush 5-40 mL  5-40 mL IntraVENous PRN    acetaminophen (TYLENOL) tablet 650 mg  650 mg Oral Q6H PRN    Or    acetaminophen (TYLENOL) suppository 650 mg  650 mg Rectal Q6H PRN    polyethylene glycol (MIRALAX) packet 17 g  17 g Oral DAILY PRN    ondansetron (ZOFRAN ODT) tablet 4 mg  4 mg Oral Q8H PRN    Or    ondansetron (ZOFRAN) injection 4 mg  4 mg IntraVENous Q6H PRN    [Held by provider] enoxaparin (LOVENOX) injection 40 mg  40 mg SubCUTAneous DAILY    LORazepam (ATIVAN) injection 1 mg  1 mg IntraVENous Q4H PRN    [Held by provider] LORazepam (ATIVAN) injection 2 mg  2 mg IntraVENous U4Q PRN    folic acid (FOLVITE) tablet 1 mg  1 mg Oral DAILY    doxycycline (VIBRA-TABS) tablet 100 mg  100 mg Oral Q12H    morphine injection 2 mg  2 mg IntraVENous Q4H PRN     ______________________________________________________________________  EXPECTED LENGTH OF STAY: 5d 0h  ACTUAL LENGTH OF STAY:          5                 Lizzie Gonzalez MD

## 2023-03-21 NOTE — PROGRESS NOTES
Problem: Falls - Risk of  Goal: *Absence of Falls  Description: Document Ric Nielson Fall Risk and appropriate interventions in the flowsheet. Outcome: Progressing Towards Goal  Note: Fall Risk Interventions:                                Problem: Patient Education: Go to Patient Education Activity  Goal: Patient/Family Education  Outcome: Progressing Towards Goal     Problem: Falls - Risk of  Goal: *Absence of Falls  Description: Document Ric Nielson Fall Risk and appropriate interventions in the flowsheet. Outcome: Progressing Towards Goal  Note: Fall Risk Interventions:                                Problem: Patient Education: Go to Patient Education Activity  Goal: Patient/Family Education  Outcome: Progressing Towards Goal     Problem: Alcohol Withdrawal  Goal: Interventions  Outcome: Progressing Towards Goal     Problem: Patient Education: Go to Patient Education Activity  Goal: Patient/Family Education  Outcome: Progressing Towards Goal     Problem: Pressure Injury - Risk of  Goal: *Prevention of pressure injury  Description: Document Kaleb Scale and appropriate interventions in the flowsheet.   Outcome: Progressing Towards Goal  Note: Pressure Injury Interventions:  Sensory Interventions: Assess changes in LOC, Assess need for specialty bed, Avoid rigorous massage over bony prominences, Check visual cues for pain, Chair cushion, Discuss PT/OT consult with provider, Float heels, Maintain/enhance activity level, Minimize linen layers, Keep linens dry and wrinkle-free, Monitor skin under medical devices, Pad between skin to skin    Moisture Interventions: Absorbent underpads, Apply protective barrier, creams and emollients, Check for incontinence Q2 hours and as needed, Assess need for specialty bed, Contain wound drainage, Limit adult briefs, Minimize layers, Moisture barrier, Offer toileting Q_hr    Activity Interventions: Assess need for specialty bed, Chair cushion, Increase time out of bed, Pressure redistribution bed/mattress(bed type), PT/OT evaluation    Mobility Interventions: Assess need for specialty bed, Chair cushion, Float heels, HOB 30 degrees or less, Pressure redistribution bed/mattress (bed type), PT/OT evaluation, Turn and reposition approx.  every two hours(pillow and wedges)    Nutrition Interventions: Document food/fluid/supplement intake, Discuss nutritional consult with provider, Offer support with meals,snacks and hydration    Friction and Shear Interventions: Apply protective barrier, creams and emollients, Feet elevated on foot rest, Foam dressings/transparent film/skin sealants, HOB 30 degrees or less, Lift sheet, Minimize layers                Problem: Patient Education: Go to Patient Education Activity  Goal: Patient/Family Education  Outcome: Progressing Towards Goal     Problem: General Medical Care Plan  Goal: *Vital signs within specified parameters  Outcome: Progressing Towards Goal  Goal: *Labs within defined limits  Outcome: Progressing Towards Goal  Goal: *Absence of infection signs and symptoms  Outcome: Progressing Towards Goal  Goal: *Skin integrity maintained  Outcome: Progressing Towards Goal  Goal: *Fluid volume balance  Outcome: Progressing Towards Goal  Goal: *Optimize nutritional status  Outcome: Progressing Towards Goal  Goal: *Progressive mobility and function (eg: ADL's)  Outcome: Progressing Towards Goal

## 2023-03-21 NOTE — PROGRESS NOTES
NIKOLAS:     Plan:  Discharge planning  Payor source assistance  Healthcare/housing resources   Transportation assistance      RUR: 9%    1:00 pm: CM met with pt to discuss discharge plan. [facility]  services used to communicate with pt. : Afia Walker, [phone]. CM asked pt if he remembers the name of the [employer]/ that helped him get to [place], [place] (per medical staff, pt was in touch with a [employer] that provided him with resources). Pt told CM that he is not in contact with a [employer] that can help him with transportation back to [place]. Pt would like help purchasing a bus ticket to get him back to his home in [place]. 2:15 pm: CM brought pt's face sheet and medication voucher down to the [address] [place] [address] outpatient pharmacy. CM located funds to purchase a bus ticket to take pt back to [place] upon discharge. CM provided pt with the tickets. MELO scheduled a Roundtrip ride to pick pt up from the hospital tomorrow morning at 10:00 am and take him to the "[employer] bus station [facility]. MELO communicated to the nurses that pt will need a snack for the trip and will need a new set of clothes. Pt did not have any questions about his discharge and/or the tickets. MELO provided pt with two copies of the ticket.     BRENT Wadsworth, Care Manager  [phone]

## 2023-03-21 NOTE — PROGRESS NOTES
0730- Bedside and Verbal shift change report given to rodrigue (oncoming nurse) by April (offgoing nurse). Report included the following information SBAR, Kardex, ED Summary, Intake/Output, MAR, and Accordion. Patient sleeping at this time     0826- patient seen and assessed. No complaints at this time. Will continue to monitor, call light in reach. 0915- morning medications given. Patient resting in bed. No complaints    1000- Pt resting in position of comfort with call bell in reach. Pt updated on plan of care and has no additional concerns at this time. 1100- Pt resting in position of comfort with call bell in reach. Pt updated on plan of care and has no additional concerns at this time. 1200- Pt resting in position of comfort with call bell in reach. Pt updated on plan of care and has no additional concerns at this time. 1300- Pt resting in position of comfort with call bell in reach. Pt updated on plan of care and has no additional concerns at this time. 80- clarified with MD mcghee patient not scheduled for d/c until tomorrow. - per MD patient only needs 3 g total of mag    1400- Pt resting in position of comfort with call bell in reach. Pt updated on plan of care and has no additional concerns at this time. 1500- Pt resting in position of comfort with call bell in reach. Pt updated on plan of care and has no additional concerns at this time. 1605- labs drawn    1705- antibiotic hung. Multiple attempt to hang made prior but patient in restroom     1800- Pt resting in position of comfort with call bell in reach. Pt updated on plan of care and has no additional concerns at this time.

## 2023-03-22 VITALS
DIASTOLIC BLOOD PRESSURE: 75 MMHG | OXYGEN SATURATION: 100 % | RESPIRATION RATE: 16 BRPM | BODY MASS INDEX: 26.35 KG/M2 | TEMPERATURE: 98.2 F | HEIGHT: 62 IN | WEIGHT: 143.2 LBS | HEART RATE: 60 BPM | SYSTOLIC BLOOD PRESSURE: 119 MMHG

## 2023-03-22 LAB
ALBUMIN SERPL-MCNC: 2.7 G/DL (ref 3.5–5)
ALBUMIN/GLOB SERPL: 0.5 (ref 1.1–2.2)
ALP SERPL-CCNC: 164 U/L (ref 45–117)
ALT SERPL-CCNC: 34 U/L (ref 12–78)
ANION GAP SERPL CALC-SCNC: 6 MMOL/L (ref 5–15)
AST SERPL-CCNC: 65 U/L (ref 15–37)
BASOPHILS # BLD: 0.1 K/UL (ref 0–0.1)
BASOPHILS NFR BLD: 3 % (ref 0–1)
BILIRUB SERPL-MCNC: 0.8 MG/DL (ref 0.2–1)
BUN SERPL-MCNC: 11 MG/DL (ref 6–20)
BUN/CREAT SERPL: 18 (ref 12–20)
CALCIUM SERPL-MCNC: 8.7 MG/DL (ref 8.5–10.1)
CHLORIDE SERPL-SCNC: 101 MMOL/L (ref 97–108)
CO2 SERPL-SCNC: 26 MMOL/L (ref 21–32)
CREAT SERPL-MCNC: 0.61 MG/DL (ref 0.7–1.3)
DIFFERENTIAL METHOD BLD: ABNORMAL
EOSINOPHIL # BLD: 0.4 K/UL (ref 0–0.4)
EOSINOPHIL NFR BLD: 12 % (ref 0–7)
ERYTHROCYTE [DISTWIDTH] IN BLOOD BY AUTOMATED COUNT: 22.7 % (ref 11.5–14.5)
GLOBULIN SER CALC-MCNC: 5.7 G/DL (ref 2–4)
GLUCOSE SERPL-MCNC: 91 MG/DL (ref 65–100)
HCT VFR BLD AUTO: 36.8 % (ref 36.6–50.3)
HGB BLD-MCNC: 11.6 G/DL (ref 12.1–17)
IMM GRANULOCYTES # BLD AUTO: 0 K/UL (ref 0–0.04)
IMM GRANULOCYTES NFR BLD AUTO: 0 % (ref 0–0.5)
LYMPHOCYTES # BLD: 1.4 K/UL (ref 0.8–3.5)
LYMPHOCYTES NFR BLD: 39 % (ref 12–49)
MAGNESIUM SERPL-MCNC: 1.2 MG/DL (ref 1.6–2.4)
MCH RBC QN AUTO: 25.6 PG (ref 26–34)
MCHC RBC AUTO-ENTMCNC: 31.5 G/DL (ref 30–36.5)
MCV RBC AUTO: 81.2 FL (ref 80–99)
MONOCYTES # BLD: 0.7 K/UL (ref 0–1)
MONOCYTES NFR BLD: 19 % (ref 5–13)
NEUTS SEG # BLD: 0.9 K/UL (ref 1.8–8)
NEUTS SEG NFR BLD: 27 % (ref 32–75)
NRBC # BLD: 0 K/UL (ref 0–0.01)
NRBC BLD-RTO: 0 PER 100 WBC
PLATELET # BLD AUTO: 97 K/UL (ref 150–400)
PLATELET COMMENTS,PCOM: ABNORMAL
PMV BLD AUTO: 10 FL (ref 8.9–12.9)
POTASSIUM SERPL-SCNC: 3.7 MMOL/L (ref 3.5–5.1)
PROT SERPL-MCNC: 8.4 G/DL (ref 6.4–8.2)
RBC # BLD AUTO: 4.53 M/UL (ref 4.1–5.7)
RBC MORPH BLD: ABNORMAL
SODIUM SERPL-SCNC: 133 MMOL/L (ref 136–145)
WBC # BLD AUTO: 3.5 K/UL (ref 4.1–11.1)

## 2023-03-22 PROCEDURE — 74011250637 HC RX REV CODE- 250/637: Performed by: INTERNAL MEDICINE

## 2023-03-22 PROCEDURE — 74011250636 HC RX REV CODE- 250/636: Performed by: NURSE PRACTITIONER

## 2023-03-22 PROCEDURE — 85025 COMPLETE CBC W/AUTO DIFF WBC: CPT

## 2023-03-22 PROCEDURE — 74011000250 HC RX REV CODE- 250: Performed by: STUDENT IN AN ORGANIZED HEALTH CARE EDUCATION/TRAINING PROGRAM

## 2023-03-22 PROCEDURE — 80053 COMPREHEN METABOLIC PANEL: CPT

## 2023-03-22 PROCEDURE — 83735 ASSAY OF MAGNESIUM: CPT

## 2023-03-22 PROCEDURE — 74011250636 HC RX REV CODE- 250/636: Performed by: INTERNAL MEDICINE

## 2023-03-22 PROCEDURE — 36415 COLL VENOUS BLD VENIPUNCTURE: CPT

## 2023-03-22 PROCEDURE — 74011250637 HC RX REV CODE- 250/637: Performed by: STUDENT IN AN ORGANIZED HEALTH CARE EDUCATION/TRAINING PROGRAM

## 2023-03-22 RX ORDER — MAGNESIUM SULFATE HEPTAHYDRATE 40 MG/ML
2 INJECTION, SOLUTION INTRAVENOUS ONCE
Status: COMPLETED | OUTPATIENT
Start: 2023-03-22 | End: 2023-03-22

## 2023-03-22 RX ORDER — MAGNESIUM SULFATE HEPTAHYDRATE 40 MG/ML
2 INJECTION, SOLUTION INTRAVENOUS
Status: COMPLETED | OUTPATIENT
Start: 2023-03-22 | End: 2023-03-22

## 2023-03-22 RX ADMIN — DICLOFENAC SODIUM 4 G: 10 GEL TOPICAL at 08:07

## 2023-03-22 RX ADMIN — MAGNESIUM OXIDE 400 MG (241.3 MG MAGNESIUM) TABLET 400 MG: TABLET at 08:07

## 2023-03-22 RX ADMIN — SODIUM CHLORIDE, PRESERVATIVE FREE 10 ML: 5 INJECTION INTRAVENOUS at 05:32

## 2023-03-22 RX ADMIN — Medication 100 MG: at 08:06

## 2023-03-22 RX ADMIN — FOLIC ACID 1 MG: 1 TABLET ORAL at 08:06

## 2023-03-22 RX ADMIN — MAGNESIUM SULFATE HEPTAHYDRATE 2 G: 40 INJECTION, SOLUTION INTRAVENOUS at 07:55

## 2023-03-22 RX ADMIN — DOXYCYCLINE HYCLATE 100 MG: 100 TABLET, COATED ORAL at 08:06

## 2023-03-22 RX ADMIN — MAGNESIUM SULFATE HEPTAHYDRATE 2 G: 40 INJECTION, SOLUTION INTRAVENOUS at 05:32

## 2023-03-22 RX ADMIN — POTASSIUM BICARBONATE 20 MEQ: 782 TABLET, EFFERVESCENT ORAL at 07:50

## 2023-03-22 RX ADMIN — ACETAMINOPHEN 650 MG: 325 TABLET ORAL at 07:53

## 2023-03-22 NOTE — PROGRESS NOTES
[facility] Adult  Hospitalist Group                                                                                          Hospitalist Progress Note  Krista Diaz MD  Answering service: [phone] from in house phone        Date of Service:  3/22/2023  NAME:  Sam Shepard  :  1975  MRN:  [id number]   Room: [facility]    Admission Summary:   Per HPI: Sam Shepard is a 52 y.o. male who presents with right ankle and right foot pain for several days ago. Patient is a Citizen of [place]-speaking patient had a good historian most of the history was provided by Citizen of [place]-speaking RN at bedside and translating services. Patient is complaining of pain in right foot and right leg. Denied any nausea vomiting abdominal pain. Patient is only AAO x1 cannot tell time date or where he is going on to tell his name PT patient states that he for past 1 year has been drinking really heavily but has not had any drink for past 1 month. Endorsed that he was in [facility] but he got endoscopy and foot repair. Patient denies taking any medication at home. Denies tobacco use any decrease in drug use       Interval history / Subjective: Follow up the patient admitted for treatment of confusion, cellulitis of right leg, CHRIS Pneumonia  NAD, acute events over night   Telemetry reviewed: NSR  Alert, oriented, appropriate, comfortable  Fluent speech  Magnesium 1.2 and has been replaced IV and PO  The patient wants to go back to [facility]  Did not require Ativan, last Ativan was given    No tremor, no hallucination,   No fever   RN notes were reviewed  Condition of the patient and plan of care was discussed with RN, CM, pharmacy during morning round. Pain RLE improved, able to ambulate with cane, WBAT RLE    PT noted were reviewed, input appreciated. Medical record from [address] reviewed, last admission to [facility]  with DT's.    was used. The patient is alert and appropriate. He is from [facility] and is in [place] for more than 2 year. He is doing electric and pluming maintenance as freelancer and is planning to go back to [facility]. The patient came to [place], [place] from [facility] as he was referred by his  to go [facility] [place], he dose not remember name of the Ho[facility]ism or address. He was able to provide his address in [facility] where he rent a place. Neurology input appreciated  MRI brain was reviewed and negative for CVA  EEG was reviewed and negative for seizure  CTA chest was repeated, reviewed independently, no PE per radiologist, + CHRIS Pneumonia  The patient denies having seizure and had last alcohol 3 weeks ago  The patient admits having right ankle fracture for 2 years, he had cast/ splint for 1 year and it was removed 1 year ago. He is able to ambulate and put weight to his right foot. Discussed with ortho, no inpatient procedure was recommended, Ok for WBAT RLE, no boots, OK to follow up with ortho as outpt on non emergent base. Assessment & Plan: Altered mental status, resolved  Acute encephalopathy possible toxic and metabolic related to alcohol and acute illness, resolved  History of heavy alcohol use  -CT head negative for acute process.   UDS positive benzodiazepine  -Ammonia improved from 56 down to 33  -Possible related to hepatic encephalopathy versus Wernicke's Korsakoff syndrome due to alcohol use versus metabolic encephalopathy due to alcohol use  -Symptoms were triggered by Ativan   -Lactulose change to once daily  -MRI brain negative for acute changes or CVA  -CIWA protocol was discontinued, pt did not require Ativan since 03/16  -completed thiamine Wernicke dose IV, continue B1 PO daily  -continue Folic acid  -T04, folate and TSH: WNL, RPR non reactive  - neurology consulted, recommendations were reviewed, no further intervention was recommended  EEG was reviewed, negative for seizure per neuro, input appreciated  Recommended to quit alcohol        Elevated D-dimer  Suspected pulmonary infarct and possible   Left upper lobe Pneumonia   -CTA with no large pulmonary embolism. Left upper lobe mass interspersed with air suggestive of pulmonary infarct. Suboptimal contrast  -Duplex no acute DVT  -Patient denies hemoptysis cough or chest pain      -Repeat CTA reviewed: no PE, + CHRIS PNA  -Quant TB given recent immigration pending  -Continue Ceftriaxone and doxycycline for possible bacterial pneumonia  day #6, will complete Doxy as outpatient for additional 5 days        Compensated cirrhosis  Portal hypertension  Coagulopathy  -CT abdomen with portal hypertension and cirrhosis. Trace ascites  -Alcohol induced   -hepatitis panel negative  -Ultrasound liver reviewed  -will need outpatient follow-up with hepatology     Right ankle swelling and redness suspected cellulitis, resolved  -X-ray with chronic distal fibular and medial malleoli fracture and soft tissue swelling  -Continue antibiotic CTX and doxy   -PT OT notes reviewed, the patient is independent  US RLE reviewed: no DVT    CHRIS Pneumonia, bacterial  Continue abx: CTX and doxy day #6, wiill complete Doxy as outpt  Dose not require supplemental O2    Right ankle Fx chronic  Per pt right ankle fracture for 2 years, he had cast or splint for 1 year and it was removed 1 year ago. He is able to ambulate and put weight to his right foot.    XR reviewed  The patient will need follow up ortho as outpt   Follow up PT/OT recommendations  Discussed with ortho on call, reviewed Xrays: no procedures was recommended, dose not need boot, OK WBAT       Thrombocytopenia due to liver cirrhosis, stable  -Monitor platelets  No acute bleeding    Hypomagnesemia, sever  Still requiring replacement of Magnesium IV and PO  Will recheck, monitor and replace    Hypokalemia  Replaced with IV and PO    KCl 20 mEq x1 PO add today    Hyponatremia, improved  Possible related to liver cirrhosis and diarrhea  Continue to monitor, will recheck   Fluids restriction requested    Social determinant of health  The patient is from [facility]  Dose not have family support  Alcohol dependency  Pt decided he dose not want to go to alcoholic rehab at [facility] in . Krishna Dubois consulted for discharge planing, and arrangement of bus tichet back to [facility] vs alcoholic rehab in [facility], [place] [address]       Code status: Full code  Prophylaxis: SCD, hold Lovenox for now due to low platelets  Care Plan discussed with: patient, RN, CM  Anticipated Disposition: home today    Inpatient  Cardiac monitoring: Telemetry        Hospital Problems  Never Reviewed            Codes Class Noted POA    AMS (altered mental status) ICD-10-CM: R41.82  ICD-9-CM: 780.97  3/16/2023 Unknown         Social Determinants of Health     Tobacco Use: Low Risk     Smoking Tobacco Use: Never    Smokeless Tobacco Use: Never    Passive Exposure: Not on file   Alcohol Use: Not on file   Financial Resource Strain: Not on file   Food Insecurity: Not on file   Transportation Needs: Not on file   Physical Activity: Not on file   Stress: Not on file   Social Connections: Not on file   Intimate Partner Violence: Not on file   Depression: Not on file   Housing Stability: Not on file       Review of Systems:   A comprehensive review of systems was negative except for that written in the HPI. Vital Signs:    Last 24hrs VS reviewed since prior progress note.  Most recent are:  Visit Vitals  /75 (BP 1 Location: Left upper arm, BP Patient Position: At rest)   Pulse 60   Temp 98.2 °F (36.8 °C)   Resp 16   Ht 5' 1.81\" (1.57 m)   Wt 65 kg (143 lb 3.2 oz)   SpO2 100%   BMI 26.35 kg/m²         Intake/Output Summary (Last 24 hours) at 3/22/2023 1825  Last data filed at 3/21/2023 2007  Gross per 24 hour   Intake 200 ml   Output --   Net 200 ml        Physical Examination:     I had a face to face encounter with this patient and independently examined them on 3/22/2023 as outlined below:          General : alert x 3, oriented and appropriate, no acute distress, comfortable, fluent speech  HEENT: PEERL, EOMI, moist mucus membrane,   Neck: supple, no JVD, no meningeal signs  Chest: Coarse to auscultation bilaterally   CVS: S1 S2 heard,   Abd: soft/ non tender, non distended, BS physiological,   Ext: no clubbing, no cyanosis, no edema RLE, brisk 2+ DP pulses  Neuro/Psych: pleasant mood and affect, CN 2-12 grossly intact, sensory grossly within normal limit, Strength 5/5 in all extremities, DTR 1+ x 4  Skin: warm     Data Review:    Review and/or order of clinical lab test    US liver:  IMPRESSION     1. Cirrhotic liver morphology. No focal hepatic mass is seen. 2. Gallbladder wall thickening which may be related to the under distention and  chronic liver disease. 3. Normal sonographic appearance of the RIGHT kidney. US RLE: No evidence of deep vein thrombosis in the right lower extremity    MRI brain:  IMPRESSION   1. Evaluation is limited by motion. No acute intracranial abnormality. 2. Generalized parenchymal volume loss, advanced for age. Mild chronic  microvascular ischemic disease. 3. Symmetric T1 shortening in the bilateral globus pallidus, most consistent  with chronic liver disease. Repeat CTA chest 03/18/2023:  IMPRESSION  1. Patchy airspace opacities in the left upper lobe which is which may  represent infection. 2.  Limited study for evaluation of pulmonary embolus. No large pulmonary  embolus is identified. 3.  Other incidental findings as above       I have personally and independently reviewed all pertinent labs, diagnostic studies, imaging, and have provided independent interpretation of the same.      Labs:     Recent Labs     03/22/23  0419 03/21/23  0819   WBC 3.5* 3.0*   HGB 11.6* 10.9*   HCT 36.8 33.8*   PLT 97* 93*     Recent Labs     03/22/23  0419 03/21/23  1604 03/21/23  0819 03/20/23  1519 03/20/23  0352   *  -- 135*  --  131*   K 3.7  --  3.7  --  3.3*     --  103  --  101   CO2 26  --  27  --  25   BUN 11  --  8  --  9   CREA 0.61*  --  0.54*  --  0.58*   GLU 91  --  95  --  96   CA 8.7  --  8.1*  --  8.4*   MG 1.2* 1.8 1.2*   < > 0.9*   PHOS  --   --   --   --  4.1    < > = values in this interval not displayed. Recent Labs     03/22/23  0419 03/21/23  0819 03/20/23  0352   ALT 34 36 28   * 144* 150*   TBILI 0.8 0.8 0.7   TP 8.4* 7.9 7.7   ALB 2.7* 2.6* 2.5*   GLOB 5.7* 5.3* 5.2*     No results for input(s): INR, PTP, APTT, INREXT, INREXT in the last 72 hours. No results for input(s): FE, TIBC, PSAT, FERR in the last 72 hours. Lab Results   Component Value Date/Time    Folate 12.4 03/16/2023 06:50 PM        No results for input(s): PH, PCO2, PO2 in the last 72 hours. No results for input(s): CPK, CKNDX, TROIQ in the last 72 hours. No lab exists for component: CPKMB  No results found for: CHOL, CHOLX, CHLST, CHOLV, HDL, HDLP, LDL, LDLC, DLDLP, TGLX, TRIGL, TRIGP, CHHD, CHHDX  No results found for: [facility]  Lab Results   Component Value Date/Time    Color DARK YELLOW 03/16/2023 06:47 AM    Appearance CLEAR 03/16/2023 06:47 AM    Specific gravity 1.025 03/16/2023 06:47 AM    pH (UA) 6.5 03/16/2023 06:47 AM    Protein Negative 03/16/2023 06:47 AM    Glucose Negative 03/16/2023 06:47 AM    Ketone TRACE (A) 03/16/2023 06:47 AM    Urobilinogen >8.0 (H) 03/16/2023 06:47 AM    Nitrites Negative 03/16/2023 06:47 AM    Leukocyte Esterase TRACE (A) 03/16/2023 06:47 AM    Epithelial cells FEW 03/16/2023 06:47 AM    Bacteria Negative 03/16/2023 06:47 AM    WBC 0-4 03/16/2023 06:47 AM    RBC 0-5 03/16/2023 06:47 AM       Notes reviewed from all clinical/nonclinical/nursing services involved in patient's clinical care. Care coordination discussions were held with appropriate clinical/nonclinical/ nursing providers based on care coordination needs.        Patients current active Medications were reviewed, considered, added and adjusted based on the clinical condition today. Home Medications were reconciled to the best of my ability given all available resources at the time of admission. Route is PO if not otherwise noted.       Admission Status: inpatient      Medications Reviewed:     Current Facility-Administered Medications   Medication Dose Route Frequency    magnesium sulfate 2 g/50 ml IVPB (premix or compounded)  2 g IntraVENous ONCE    naloxone (NARCAN) injection 0.4 mg  0.4 mg IntraMUSCular PRN    magnesium oxide (MAG-OX) tablet 400 mg  400 mg Oral BID    lactulose (CHRONULAC) 10 gram/15 mL solution 15 mL  15 mL Oral DAILY    diclofenac (VOLTAREN) 1 % topical gel 4 g  4 g Topical QID    cefTRIAXone (ROCEPHIN) 1 g in 0.9% sodium chloride (MBP/ADV) 50 mL MBP  1 g IntraVENous Q24H    thiamine HCL (B-1) tablet 100 mg  100 mg Oral DAILY    sodium chloride (NS) flush 5-40 mL  5-40 mL IntraVENous Q8H    sodium chloride (NS) flush 5-40 mL  5-40 mL IntraVENous PRN    acetaminophen (TYLENOL) tablet 650 mg  650 mg Oral Q6H PRN    Or    acetaminophen (TYLENOL) suppository 650 mg  650 mg Rectal Q6H PRN    polyethylene glycol (MIRALAX) packet 17 g  17 g Oral DAILY PRN    ondansetron (ZOFRAN ODT) tablet 4 mg  4 mg Oral Q8H PRN    Or    ondansetron (ZOFRAN) injection 4 mg  4 mg IntraVENous Q6H PRN    [Held by provider] enoxaparin (LOVENOX) injection 40 mg  40 mg SubCUTAneous DAILY    LORazepam (ATIVAN) injection 1 mg  1 mg IntraVENous Q4H PRN    [Held by provider] LORazepam (ATIVAN) injection 2 mg  2 mg IntraVENous L2X PRN    folic acid (FOLVITE) tablet 1 mg  1 mg Oral DAILY    doxycycline (VIBRA-TABS) tablet 100 mg  100 mg Oral Q12H    morphine injection 2 mg  2 mg IntraVENous Q4H PRN     ______________________________________________________________________  EXPECTED LENGTH OF STAY: 5d 0h  ACTUAL LENGTH OF STAY:          6                 Winston Onofre MD

## 2023-03-22 NOTE — DISCHARGE SUMMARY
Physician Discharge Summary     Patient ID:    Chandler Martines  [id number]  [unfilled]  1975    Admit date: 3/16/2023    Discharge date and time: 3/22/2023      DISCHARGE CONDITION: Stable        Hospital Diagnoses and Treatment Rendered:   Chandler Martines is a 52 y.o. male who presents with right ankle and right foot pain for several days ago. Patient is a [place]-speaking patient had a good historian most of the history was provided by Mosotho-speaking RN at bedside and translating services. Patient is complaining of pain in right foot and right leg. Denied any nausea vomiting abdominal pain. Patient is only AAO x1 cannot tell time date or where he is going on to tell his name PT patient states that he for past 1 year has been drinking really heavily but has not had any drink for past 1 month. Endorsed that he was in [facility] but he got endoscopy and foot repair. Patient denies taking any medication at home. Denies tobacco use any decrease in drug use. The patient was admitted to medicine, antibiotics IV were initiated Ceftriaxone  and doxy, CIWA protocol was applied, lactulose was given, electrolytes were replaced. Repeat CTA chest was negative for PE and positive for pneumonia. On current medical management condition of the patient improved, acute encephalopathy resolved, CIWA was 0 and protocol was discontinued. PT/OT evaluated the patient and he was able to ambulate independently with cane. MRI brain was negative for acute CVA, EEG was negative for seizure. 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 The patient has chronic fracture of the right ankle and per ortho was recommended WBAT and no emergent procedures were advised, the patient should get referral to ortho as outpatient by his PCP. The patient was advised alcohol cessation.   Rest of hospital stay the patient remained hemodynamically stable , afebrile, was alert and oriented, was able to express himself and converse in English, the patietn was able to tolerate PO diet and ambulate by himself without assistance and he express wish to be discharged home to go back to [facility] by bus. Altered mental status, resolved  Acute encephalopathy possible toxic and metabolic related to alcohol and acute illness, resolved  History of heavy alcohol use  -CT head negative for acute process. UDS positive benzodiazepine  -Ammonia improved from 56 down to 33  -Possible related to hepatic encephalopathy versus Wernicke's Korsakoff syndrome due to alcohol use versus metabolic encephalopathy due to alcohol use  -Symptoms were triggered by Ativan   -Lactulose change to once daily  -MRI brain negative for acute changes or CVA  -CIWA protocol was discontinued, pt did not require Ativan since 03/16  -completed thiamine Wernicke dose IV, continue B1 PO daily  -continue Folic acid  -G68, folate and TSH: WNL, RPR non reactive  - neurology consulted, recommendations were reviewed, no further intervention was recommended  EEG was reviewed, negative for seizure per neuro, input appreciated  Recommended to quit alcohol        Elevated D-dimer  Suspected pulmonary infarct and possible   Left upper lobe Pneumonia   -CTA with no large pulmonary embolism. Left upper lobe mass interspersed with air suggestive of pulmonary infarct. Suboptimal contrast  -Duplex no acute DVT  -Patient denies hemoptysis cough or chest pain      -Repeat CTA reviewed: no PE, + CHRIS PNA  -Quant TB given recent immigration pending  -Continue Ceftriaxone and doxycycline for possible bacterial pneumonia  day #6, will complete Doxy as outpatient for additional 5 days        Compensated cirrhosis  Portal hypertension  Coagulopathy  -CT abdomen with portal hypertension and cirrhosis.   Trace ascites  -Alcohol induced   -hepatitis panel negative  -Ultrasound liver reviewed  -will need outpatient follow-up with hepatology     Right ankle swelling and redness suspected cellulitis, resolved  -X-ray with chronic distal fibular and medial malleoli fracture and soft tissue swelling  -Continue antibiotic CTX and doxy   -PT OT notes reviewed, the patient is independent  US RLE reviewed: no DVT     CHRIS Pneumonia, bacterial  Continue abx: CTX and doxy day #6, wiill complete Doxy as outpt  Dose not require supplemental O2     Right ankle Fx chronic  Per pt right ankle fracture for 2 years, he had cast or splint for 1 year and it was removed 1 year ago. He is able to ambulate and put weight to his right foot.    XR reviewed  The patient will need follow up ortho as outpt   Follow up PT/OT recommendations  Discussed with ortho on call, reviewed Xrays: no procedures was recommended, dose not need boot, OK WBAT        Thrombocytopenia due to liver cirrhosis, stable  -Monitor platelets  No acute bleeding     Hypomagnesemia, sever  Still requiring replacement of Magnesium IV and PO  Will recheck, monitor and replace     Hypokalemia  Replaced with IV and PO    KCl 20 mEq x1 PO add today     Hyponatremia, improved  Possible related to liver cirrhosis and diarrhea  Continue to monitor, will recheck   Fluids restriction requested     Social determinant of health  The patient is from [facility]  Dose not have family support  Alcohol dependency  Pt decided he dose not want to go to alcoholic rehab at [facility] in . Krishna Dubois consulted for discharge planing, and arrangement of bus tichet back to [facility] vs alcoholic rehab in [facility]         Chronic Diagnoses:    Problem List as of 3/22/2023 Never Reviewed            Codes Class Noted - Resolved    AMS (altered mental status) ICD-10-CM: R41.82  ICD-9-CM: 780.97  3/16/2023 - Present           Discharge Medications:       Stable  Discharge Medication List as of 3/22/2023  7:18 AM        START taking these medications    Details   magnesium oxide (MAG-OX) 400 mg tablet Take 1 Tablet by mouth two (2) times a day for 5 days. , Normal, Disp-10 Tablet, R-0      thiamine HCL (B-1) 100 mg tablet Take 1 Tablet by mouth daily for 10 days. , Normal, Disp-10 Tablet, R-0      folic acid (FOLVITE) 1 mg tablet Take 1 Tablet by mouth daily for 10 days. , Normal, Disp-10 Tablet, R-0           CONTINUE these medications which have CHANGED    Details   doxycycline (VIBRA-TABS) 100 mg tablet Take 1 Tablet by mouth two (2) times a day for 5 days. , Normal, Disp-10 Tablet, R-0               Follow up Care:    1. PCP in 1-2 weeks. Please call to set up an appointment shortly after discharge. 2. Follow up hepatologist for evaluation liver cirrhosis. 3. Follow up ortho for evaluation of chronic right ankle fracture. 4. Alcohol cessation is recommended. 5. Complete Antibiotic Doxy for additional 5 days. 6. Follow up PCP, repeat BMP and magnesium level in 1 week. Diet:  Regular Diet    Disposition:  Home. Advanced Directive:   FULL X   DNR      Discharge Exam:  See today's note. CONSULTATIONS: Neurology    Significant Diagnostic Studies:   3/16/2023: BUN 12 MG/DL (Ref range: 6 - 20 MG/DL); Calcium 7.9 MG/DL (L; Ref range: 8.5 - 10.1 MG/DL); CO2 26 mmol/L (Ref range: 21 - 32 mmol/L); Creatinine 0.75 MG/DL (Ref range: 0.70 - 1.30 MG/DL); Glucose 95 mg/dL (Ref range: 65 - 100 mg/dL); HCT 31.2 % (L; Ref range: 36.6 - 50.3 %); HGB 10.2 g/dL (L; Ref range: 12.1 - 17.0 g/dL); Potassium 3.4 mmol/L (L; Ref range: 3.5 - 5.1 mmol/L); Sodium 135 mmol/L (L; Ref range: 136 - 145 mmol/L)  3/17/2023: BUN 5 MG/DL (L; Ref range: 6 - 20 MG/DL); Calcium 7.7 MG/DL (L; Ref range: 8.5 - 10.1 MG/DL); CO2 26 mmol/L (Ref range: 21 - 32 mmol/L); Creatinine 0.62 MG/DL (L; Ref range: 0.70 - 1.30 MG/DL); Glucose 104 mg/dL (H; Ref range: 65 - 100 mg/dL); HCT 31.4 % (L; Ref range: 36.6 - 50.3 %); HGB 10.2 g/dL (L; Ref range: 12.1 - 17.0 g/dL); Potassium 3.3 mmol/L (L; Ref range: 3.5 - 5.1 mmol/L);  Sodium 136 mmol/L (Ref range: 136 - 145 mmol/L)  Recent Labs     03/22/23 0419 03/21/23  0819   WBC 3.5* 3.0*   HGB 11.6* 10.9*   HCT 36.8 33.8*   PLT 97* 93*     Recent Labs     03/22/23  0419 03/21/23  1604 03/21/23  0819 03/20/23  1519 03/20/23  0352   *  --  135*  --  131*   K 3.7  --  3.7  --  3.3*     --  103  --  101   CO2 26  --  27  --  25   BUN 11  --  8  --  9   CREA 0.61*  --  0.54*  --  0.58*   GLU 91  --  95  --  96   CA 8.7  --  8.1*  --  8.4*   MG 1.2* 1.8 1.2*   < > 0.9*   PHOS  --   --   --   --  4.1    < > = values in this interval not displayed. Recent Labs     03/22/23 0419 03/21/23  0819 03/20/23  0352   * 144* 150*   TP 8.4* 7.9 7.7   ALB 2.7* 2.6* 2.5*   GLOB 5.7* 5.3* 5.2*     No results for input(s): INR, PTP, APTT, INREXT, INREXT in the last 72 hours. No results for input(s): FE, TIBC, PSAT, FERR in the last 72 hours. No results for input(s): PH, PCO2, PO2 in the last 72 hours. No results for input(s): CPK, CKMB in the last 72 hours.     No lab exists for component: TROPONINI  No components found for: Kannan Asif          Signed:  Tosin River MD  3/22/2023  8:22 AM

## 2023-03-22 NOTE — PROGRESS NOTES
1915: Bedside and Verbal shift change report given to Alfredo Regalado RN (oncoming nurse) by Bernadette Smith RN (offgoing nurse). Report included the following information SBAR, Kardex, MAR, and Recent Results. [id number] Arsenio, NP the following: \"Patient was admitted with RLE cellulitis, metabolic encephalopathy, and ETOH withdrawal. His magnesium is 1.2 this morning. \" Orders received to replace magnesium. 0700: Bedside and Verbal shift change report given to Bernadette Smith RN (oncoming nurse) by Alfredo Regalado RN (offgoing nurse). Report included the following information SBAR, Kardex, MAR, and Recent Results.

## 2023-03-22 NOTE — PROGRESS NOTES
0700-Bedside and Verbal shift change report given to rodrigue (oncoming nurse) by Rojas Gutiérrez (offgoing nurse). Report included the following information SBAR, Kardex, ED Summary, Intake/Output, MAR, Accordion, and Recent Results. 12- notified MD mchgee patient mag low this morning 1.2. Per MD give additional 2 grams. 4377- Patient seen and assessed. No complaints at this time. Will continue to monitor, call light in reach. 0800- spoke to care manager to retrieve medications from pharmacy     0553- provided patient with clothes, snack and belongings packed up. AM medications given. 1531- patient resting in bed. 0930- Discharge instructions were given to the patient by Citlali Syed RN. Patient refused interpretor service and understood instructs via teach back. The patient verbalized understanding of discharge instructions and prescriptions, all questions were answered. The patient has no further concerns at this time. 2595- midline pulled and site secure with tagaderm and gauze. 14 cm noted. The patient left the medical surgical floor ambulatory via wheelchair with NENA husain, alert and oriented and in no acute distress with all his prescriptions from down stairs pharmacy. The patient was encouraged to call or return to the ED in [place] for worsening issues or problems and was encouraged to schedule a follow up appointment for continuing care. Patient placed in [facility].

## 2023-03-22 NOTE — PROGRESS NOTES
Communication Note    Patient speaks Vietnamese as their preferred language for their healthcare communication, please reach out to Language Services for  services at:     Senior Jim  - Navigator - [phone]    Email: [email or web address]  General phone: [phone] ([phone])    Our interpreters are available for team members working with limited English proficient (LEP) patients remotely, in person as well as phone or video interpreters on the [employer]. For the LATEST Language services updates/ resources please see our Language Services page on:[facility] under the Clinical Resources tab. Please always document the use of  services (name and/or number of ) in your clinical notes.

## 2023-12-02 NOTE — ED ADULT TRIAGE NOTE - AS TEMP SITE
History of Present Illness   Terrance Yang is a 62 year old male being seen for DM and hypertension.     Reports that he is taking medicine without any difficulty or side effects.      Exercise: none. 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 He has been sedentary for the past 3 weeks.  Efforts to change diet: none, he has been eating more during the holidays.    There has been no polyuria, polydipsia, polyphagia, episodes of hypoglycemia.      reports that he has never smoked. He has never used smokeless tobacco.    Terrance Yang's problem list, allergies, medications and vital signs were reviewed.    Review of Systems   No fever, body aches, chills, visual disturbances, weakness, sore throat, cough, congestion, chest pain, SOB, leg swelling or paresthesia.    Physical Exam     Vitals:    12/02/23 1127 12/02/23 1132   BP: (!) 149/67 (!) 146/74   BP Location: RUE - Right upper extremity LUE - Left upper extremity   Patient Position: Sitting Sitting   Cuff Size: Regular Regular   Pulse: 66 64   Resp: 17    Temp: 98.4 °F (36.9 °C)    TempSrc: Temporal    Weight: 67.7 kg (149 lb 4.8 oz)    PainSc:  0  0     Body mass index is 25.63 kg/m².    General:  Alert and oriented.  Cooperative, no acute distress.  Well groomed.   Cardiovascular:  Regular S1 and S2.  No S3, S4 murmurs or thrills.  Pulmonary:  CTA.  No use of accessory muscles, intercostal retraction or respiratory distress noted.   Extremities/Feet:  No clubbing, cyanosis or edema.    Skin:  No rashes, lesions.  Good skin turgor.  Neuro:  CN 2-12 grossly intact.  Strong, steady bhatt gait.    Laboratory Data     Diabetes Mellitus (Cmd)  - the last A1c was 10.4 (12/02/2023), this is trending up compared to 9 (08/26/2023)  - Reported symptoms of low or high blood sugar? no  - Prescribed statin? Yes  - Most recent LDL: 142 (5/20/2023).  - Most recent eGFR: >90 (8/26/2023)    Assessment and Plan     Diabetes Mellitus- uncontrolled  • Add 30  minutes of exercise 5 times per week.   • Increase Lantus insulin to 36 units at bedtime.  • Continue other meds.   • Reschedule retinal exam  • Check and log blood sugars twice daily  Follow up:  2 weeks with RN for recheck of blood pressure and blood sugars  Oneida Herrera NP       oral

## 2024-10-23 NOTE — ADVANCED PRACTICE NURSE
Magnesium 0.9 on AM labs. Will replete. Order for recheck s/p infusion placed. Pt is asymptomatic and vitals remain stable. Nursing to continue to monitor. Thank you for choosing [facility]!     Our goal in the [facility] is to always provide outstanding medical care. You may receive a survey by mail or e-mail in the next week regarding your experience today. We would greatly appreciate you completing and returning the survey. Your feedback provides us with a way to recognize our staff who provide very good care, and it helps us learn how to improve when your experience was below our aspiration of excellence.       We appreciate you trusting us with your medical care. We hope you feel better soon. We will be happy to take care of you for all of your future medical needs.  You must understand that you've received an Urgent Care treatment only and that you may be released before all your medical problems are known or treated. You, the patient, will arrange for follow up care as instructed.  Follow up with your PCP or specialty clinic as directed in the next 1-2 weeks if not improved or as needed.  You can call [phone] to schedule an appointment with the appropriate provider.  If your condition worsens we recommend that you receive another evaluation in person, with your primary care provider, urgent care or at the emergency room immediately or contact your primary medical clinics after hours call service to discuss your concerns.     Here's a summary of the over the counter options provided:  Hydration: Ensure you stay well hydrated to support your body's functions during illness.  Nasal Saline: Use nasal saline to help move post-nasal drip and relieve congestion.  Warm Salt Water Gargles: Gargle with warm salt water as needed to soothe a sore throat.  Antihistamines: Claritin, Zyrtec, or Allegra can help dry out excess mucus caused by allergies.  Pseudoephedrine: Consider using pseudoephedrine for decongestion, but be aware of its potential effects on blood pressure and palpitations.  Mucinex (Guaifenesin): Use mucinex to  break up mucus and alleviate congestion. Mucinex DM can also help suppress coughs, especially at night.  Afrin: Afrin can be used temporarily to relieve nasal congestion, but limit usage to no more than 3 days due to its addictive nature and potential side effects.  Flonase: Flonase, a steroid nasal spray, can help manage nasal inflammation, but discontinue use if you experience nosebleeds.  Nyquil: Nyquil may aid in restful sleep, but it contains sedatives and antihistamines.  Honey: Honey can act as a natural cough suppressant.  Tylenol (Acetaminophen): Tylenol is safe in doses up to 4,000 mg per day for short periods and can help alleviate pain and fever.  Ibuprofen: Ibuprofen is a non-steroidal anti-inflammatory drug that can also help reduce pain and fever.  Remember to use these medications according to the recommended dosages and durations. If you have any concerns or experience unexpected side effects, it's best to consult with a healthcare professional for guidance tailored to your specific needs and conditionsti-inflammatory that can be used for body aches, pain, and fever.However it can also cause stomach irritation if over used.